# Patient Record
Sex: FEMALE | Race: WHITE | NOT HISPANIC OR LATINO | Employment: UNEMPLOYED | URBAN - METROPOLITAN AREA
[De-identification: names, ages, dates, MRNs, and addresses within clinical notes are randomized per-mention and may not be internally consistent; named-entity substitution may affect disease eponyms.]

---
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
## 2022-01-23 DIAGNOSIS — G43.009 MIGRAINE WITHOUT AURA AND WITHOUT STATUS MIGRAINOSUS, NOT INTRACTABLE: ICD-10-CM

## 2022-01-23 RX ORDER — SUMATRIPTAN 50 MG/1
50 TABLET, FILM COATED ORAL ONCE AS NEEDED
Qty: 12 TABLET | Refills: 0 | Status: SHIPPED | OUTPATIENT
Start: 2022-01-23 | End: 2022-04-27

## 2022-02-14 ENCOUNTER — TELEPHONE (OUTPATIENT)
Dept: FAMILY MEDICINE CLINIC | Facility: CLINIC | Age: 22
End: 2022-02-14

## 2022-02-14 NOTE — TELEPHONE ENCOUNTER
Cannot be seen virtually as [place] is [place]  She can see a provider locally       Meek Raphael, [facility]  2/14/2022 1:53 PM

## 2022-02-14 NOTE — TELEPHONE ENCOUNTER
Pt notified       Pt will look for a provider in [place] in case of medical emergency, pt aware of the licence rule for certain states  and said she had earlier the same issues with the therapist      Pt will schedule appt in May with Dr Sullivan upon arrival

## 2022-02-14 NOTE — TELEPHONE ENCOUNTER
T/c from pt -     Pt would like to discuss ongoing migraine issues which are getting more intense  Pt stays in [place] until May 2022 (has school until May 2022)  -  Pt aware of that that Dillan Torre does not have licence for [place] state - pt requested to speak to Dr Cristofer Leong about options / possibilities virtually  Please advise       #[phone]

## 2022-03-14 ENCOUNTER — OFFICE VISIT (OUTPATIENT)
Dept: FAMILY MEDICINE CLINIC | Facility: CLINIC | Age: 22
End: 2022-03-14
Payer: COMMERCIAL

## 2022-03-14 VITALS
SYSTOLIC BLOOD PRESSURE: 104 MMHG | WEIGHT: 131 LBS | BODY MASS INDEX: 24.11 KG/M2 | OXYGEN SATURATION: 98 % | HEART RATE: 76 BPM | DIASTOLIC BLOOD PRESSURE: 74 MMHG | HEIGHT: 62 IN | TEMPERATURE: 97.7 F

## 2022-03-14 DIAGNOSIS — G43.009 MIGRAINE WITHOUT AURA AND WITHOUT STATUS MIGRAINOSUS, NOT INTRACTABLE: Primary | ICD-10-CM

## 2022-03-14 DIAGNOSIS — F32.1 MODERATE MAJOR DEPRESSION (HCC): ICD-10-CM

## 2022-03-14 DIAGNOSIS — F41.1 GAD (GENERALIZED ANXIETY DISORDER): ICD-10-CM

## 2022-03-14 PROBLEM — Z30.41 ENCOUNTER FOR SURVEILLANCE OF CONTRACEPTIVE PILLS: Status: RESOLVED | Noted: 2018-11-20 | Resolved: 2022-03-14

## 2022-03-14 PROCEDURE — 1036F TOBACCO NON-USER: CPT | Performed by: FAMILY MEDICINE

## 2022-03-14 PROCEDURE — 99214 OFFICE O/P EST MOD 30 MIN: CPT | Performed by: FAMILY MEDICINE

## 2022-03-14 PROCEDURE — 3008F BODY MASS INDEX DOCD: CPT | Performed by: FAMILY MEDICINE

## 2022-03-14 PROCEDURE — 3725F SCREEN DEPRESSION PERFORMED: CPT | Performed by: FAMILY MEDICINE

## 2022-03-14 RX ORDER — TOPIRAMATE 25 MG/1
TABLET ORAL
Qty: 87 TABLET | Refills: 0 | Status: SHIPPED | OUTPATIENT
Start: 2022-03-14 | End: 2022-04-05

## 2022-03-14 NOTE — PATIENT INSTRUCTIONS
Please taking 25 mg of Topamax daily for 3 days, then increase to 25 mg of Topamax two times per day for 7 days  Then increase to 50 mg in the morning AND 25 mgg in the evening for 7 days  Lastly, increase to 50 mg of Topamax 2 times per day

## 2022-04-25 DIAGNOSIS — G43.009 MIGRAINE WITHOUT AURA AND WITHOUT STATUS MIGRAINOSUS, NOT INTRACTABLE: ICD-10-CM

## 2022-04-26 RX ORDER — TOPIRAMATE 25 MG/1
50 TABLET ORAL 2 TIMES DAILY
Qty: 120 TABLET | Refills: 0 | Status: SHIPPED | OUTPATIENT
Start: 2022-04-26 | End: 2022-05-16 | Stop reason: ALTCHOICE

## 2022-04-27 DIAGNOSIS — G43.009 MIGRAINE WITHOUT AURA AND WITHOUT STATUS MIGRAINOSUS, NOT INTRACTABLE: ICD-10-CM

## 2022-04-27 RX ORDER — SUMATRIPTAN 50 MG/1
TABLET, FILM COATED ORAL
Qty: 12 TABLET | Refills: 0 | Status: SHIPPED | OUTPATIENT
Start: 2022-04-27 | End: 2022-05-16 | Stop reason: SDUPTHER

## 2022-05-16 ENCOUNTER — OFFICE VISIT (OUTPATIENT)
Dept: FAMILY MEDICINE CLINIC | Facility: CLINIC | Age: 22
End: 2022-05-16
Payer: COMMERCIAL

## 2022-05-16 VITALS
SYSTOLIC BLOOD PRESSURE: 124 MMHG | WEIGHT: 137 LBS | HEIGHT: 62 IN | BODY MASS INDEX: 25.21 KG/M2 | TEMPERATURE: 98.8 F | DIASTOLIC BLOOD PRESSURE: 74 MMHG | OXYGEN SATURATION: 97 % | HEART RATE: 84 BPM

## 2022-05-16 DIAGNOSIS — G43.009 MIGRAINE WITHOUT AURA AND WITHOUT STATUS MIGRAINOSUS, NOT INTRACTABLE: ICD-10-CM

## 2022-05-16 DIAGNOSIS — Z00.00 ANNUAL PHYSICAL EXAM: Primary | ICD-10-CM

## 2022-05-16 DIAGNOSIS — F32.1 MODERATE MAJOR DEPRESSION (HCC): ICD-10-CM

## 2022-05-16 PROBLEM — F32.2 MODERATELY SEVERE MAJOR DEPRESSION (HCC): Status: ACTIVE | Noted: 2022-05-16

## 2022-05-16 PROBLEM — F32.2 MODERATELY SEVERE MAJOR DEPRESSION (HCC): Status: RESOLVED | Noted: 2022-05-16 | Resolved: 2022-05-16

## 2022-05-16 PROCEDURE — 99395 PREV VISIT EST AGE 18-39: CPT | Performed by: FAMILY MEDICINE

## 2022-05-16 PROCEDURE — 3008F BODY MASS INDEX DOCD: CPT | Performed by: FAMILY MEDICINE

## 2022-05-16 PROCEDURE — 1036F TOBACCO NON-USER: CPT | Performed by: FAMILY MEDICINE

## 2022-05-16 PROCEDURE — 3725F SCREEN DEPRESSION PERFORMED: CPT | Performed by: FAMILY MEDICINE

## 2022-05-16 RX ORDER — SUMATRIPTAN 50 MG/1
50 TABLET, FILM COATED ORAL ONCE AS NEEDED
Qty: 12 TABLET | Refills: 0 | Status: SHIPPED | OUTPATIENT
Start: 2022-05-16

## 2022-05-16 NOTE — PATIENT INSTRUCTIONS

## 2022-05-16 NOTE — PROGRESS NOTES
[address]     NAME: Paulette Ramboperla  AGE: 23 y o  SEX: female  : 2000     DATE: 2022     Assessment and Plan:     Problem List Items Addressed This Visit        Cardiovascular and Mediastinum    Migraine without aura and without status migrainosus, not intractable    Relevant Medications    SUMAtriptan (IMITREX) 50 mg tablet       Other    Moderate major depression ([address] )      Other Visit Diagnoses     Annual physical exam    -  Primary    BMI 25 0-25 9,adult            Immunizations and preventive care screenings were discussed with patient today  Appropriate education was printed on patient's after visit summary  Counseling:  Alcohol/drug use: discussed moderation in alcohol intake, the recommendations for healthy alcohol use, and avoidance of illicit drug use  Dental Health: discussed importance of regular tooth brushing, flossing, and dental visits  Sexual health: discussed sexually transmitted diseases, partner selection, use of condoms, avoidance of unintended pregnancy, and contraceptive alternatives  · Exercise: the importance of regular exercise/physical activity was discussed  Recommend exercise 3-5 times per week for at least 30 minutes  BMI Counseling: Body mass index is 25 06 kg/m²  The BMI is above normal  Nutrition recommendations include decreasing portion sizes, encouraging healthy choices of fruits and vegetables, consuming healthier snacks, limiting drinks that contain sugar, moderation in carbohydrate intake, increasing intake of lean protein and reducing intake of cholesterol  Exercise recommendations include moderate physical activity 150 minutes/week and exercising 3-5 times per week  No pharmacotherapy was ordered  Rationale for BMI follow-up plan is due to patient being overweight or obese         Return in about 1 year (around 2023) for Annual physical  Chief Complaint:     Chief Complaint   Patient presents with    Physical Exam      History of Present Illness:     Adult Annual Physical   Patient here for a comprehensive physical exam  The patient reports no problems  Notes that she stopped her Topamax as it was making her nauseous  Notes that she left her Imitrex at school and would like a refill so she can use it while at home  Diet and Physical Activity  · Diet/Nutrition: well balanced diet  · Exercise: walking, moderate cardiovascular exercise, 3-4 times a week on average and 30-60 minutes on average  Depression Screening  PHQ-2/9 Depression Screening    Little interest or pleasure in doing things: 2 - more than half the days  Feeling down, depressed, or hopeless: 1 - several days  Trouble falling or staying asleep, or sleeping too much: 3 - nearly every day  Feeling tired or having little energy: 2 - more than half the days  Poor appetite or overeatin - not at all  Feeling bad about yourself - or that you are a failure or have let yourself or your family down: 0 - not at all  Trouble concentrating on things, such as reading the newspaper or watching television: 1 - several days  Moving or speaking so slowly that other people could have noticed  Or the opposite - being so fidgety or restless that you have been moving around a lot more than usual: 1 - several days  Thoughts that you would be better off dead, or of hurting yourself in some way: 0 - not at all  PHQ-9 Score: 10   PHQ-9 Interpretation: Moderate depression      Feels that she is doing well from a mental health standpoint  Will resume therapy with her counselor when she gets back to school  General Health  · Sleep: sleeps well and gets 7-8 hours of sleep on average  · Hearing: normal - bilateral   · Vision: no vision problems  · Dental: regular dental visits, brushes teeth twice daily and does not floss       /GYN Health  · Last menstrual period: 22  · Contraceptive method: oral contraceptives  · History of STDs?: no   · Not sexually active currently  Review of Systems:     Review of Systems   Constitutional: Negative for activity change, appetite change, fatigue and fever  HENT: Negative for congestion, ear pain, rhinorrhea and sore throat  Eyes: Negative for pain  Respiratory: Negative for cough and shortness of breath  Cardiovascular: Negative for chest pain and leg swelling  Gastrointestinal: Negative for abdominal distention, abdominal pain, constipation, diarrhea, nausea and vomiting  Genitourinary: Negative for dysuria, frequency and urgency  Musculoskeletal: Negative for gait problem  Skin: Negative for rash  Neurological: Negative for dizziness, light-headedness and headaches  Past Medical History:     Past Medical History:   Diagnosis Date    Hordeolum externum     unspecified laterality Last assessed: 5/1/2014    Scoliosis     description: 6 3 degrees in 3/2013    Walking pneumonia     last assessed: 1/20/2016      Past Surgical History:     Past Surgical History:   Procedure Laterality Date    ANKLE SURGERY Left 04/2017      Social History:     Social History     Socioeconomic History    Marital status: Single     Spouse name: None    Number of children: None    Years of education: None    Highest education level: None   Occupational History    None   Tobacco Use    Smoking status: Never Smoker    Smokeless tobacco: Never Used    Tobacco comment: no smoke exposure   Vaping Use    Vaping Use: Never used   Substance and Sexual Activity    Alcohol use:  Yes     Alcohol/week: 2 0 standard drinks     Types: 2 Standard drinks or equivalent per week    Drug use: Never    Sexual activity: Never     Birth control/protection: OCP   Other Topics Concern    None   Social History Narrative    Living with parents - parents , younger brother, younger sister    Always uses seat belt    Caffeine use    Completed 12 th grade, [employer], 2020; will attend college in [place] in 2020    Feels safe at home     - denied     Has carbon monoxide detectors in home    Has smoke detectors    Pets:Cat    Student     Social Determinants of Health     Financial Resource Strain: Not on file   Food Insecurity: Not on file   Transportation Needs: Not on file   Physical Activity: Not on file   Stress: Not on file   Social Connections: Not on file   Intimate Partner Violence: Not on file   Housing Stability: Not on file      Family History:     Family History   Problem Relation Age of Onset    Migraines Mother     No Known Problems Father     Bone cancer Family     Heart disease Family     Lung cancer Maternal Grandmother     Multiple sclerosis Maternal Grandmother     No Known Problems Paternal Grandmother     Heart disease Paternal Grandfather       Current Medications:     Current Outpatient Medications   Medication Sig Dispense Refill    albuterol (PROVENTIL HFA,VENTOLIN HFA) 90 mcg/act inhaler Inhale 2 puffs every 4 (four) hours as needed for wheezing or shortness of breath (cough) 1 Inhaler 0    escitalopram (LEXAPRO) 20 mg tablet Take 1 tablet (20 mg total) by mouth daily 90 tablet 1    hydrOXYzine HCL (ATARAX) 25 mg tablet Take 1 tablet (25 mg total) by mouth every 6 (six) hours as needed for anxiety 30 tablet 0    Junel FE  1-20 MG-MCG per tablet Take 1 tablet by mouth daily 28 tablet 13    omeprazole (PriLOSEC) 20 mg delayed release capsule TAKE 1 CAPSULE BY MOUTH EVERY DAY 90 capsule 1    SUMAtriptan (IMITREX) 50 mg tablet Take 1 tablet (50 mg total) by mouth once as needed for migraine 12 tablet 0    triamcinolone (KENALOG) 0 5 % ointment Apply topically 2 (two) times a day 30 g 0     No current facility-administered medications for this visit        Allergies:     No Known Allergies      Physical Exam:     /74 (BP Location: Left arm, Patient Position: Sitting, Cuff Size: Adult) Pulse 84   Temp 98 8 °F (37 1 °C)   Ht 5' 2" (1 575 m)   Wt 62 1 kg (137 lb)   LMP 05/08/2022   SpO2 97%   BMI 25 06 kg/m²     Physical Exam  Vitals reviewed  Constitutional:       General: She is not in acute distress  Appearance: Normal appearance  HENT:      Head: Normocephalic and atraumatic  Right Ear: External ear normal       Left Ear: External ear normal       Nose: Nose normal       Mouth/Throat:      Mouth: Mucous membranes are moist    Eyes:      Extraocular Movements: Extraocular movements intact  Conjunctiva/sclera: Conjunctivae normal       Pupils: Pupils are equal, round, and reactive to light  Cardiovascular:      Rate and Rhythm: Normal rate and regular rhythm  Heart sounds: Normal heart sounds  Pulmonary:      Effort: Pulmonary effort is normal       Breath sounds: Normal breath sounds  Abdominal:      General: Bowel sounds are normal  There is no distension  Palpations: Abdomen is soft  Tenderness: There is no abdominal tenderness  Musculoskeletal:      Cervical back: Neck supple  Right lower leg: No edema  Left lower leg: No edema  Lymphadenopathy:      Cervical: No cervical adenopathy  Skin:     General: Skin is warm  Capillary Refill: Capillary refill takes less than 2 seconds  Findings: No rash  Neurological:      Mental Status: She is alert  Mental status is at baseline          Alesia Murrell DO   [address]

## 2022-06-04 DIAGNOSIS — F32.2 MODERATELY SEVERE MAJOR DEPRESSION (HCC): ICD-10-CM

## 2022-06-04 RX ORDER — ESCITALOPRAM OXALATE 20 MG/1
TABLET ORAL
Qty: 90 TABLET | Refills: 1 | Status: SHIPPED | OUTPATIENT
Start: 2022-06-04

## 2022-06-23 ENCOUNTER — OFFICE VISIT (OUTPATIENT)
Dept: OBGYN CLINIC | Facility: MEDICAL CENTER | Age: 22
End: 2022-06-23
Payer: COMMERCIAL

## 2022-06-23 VITALS
SYSTOLIC BLOOD PRESSURE: 110 MMHG | BODY MASS INDEX: 24.77 KG/M2 | DIASTOLIC BLOOD PRESSURE: 70 MMHG | HEIGHT: 62 IN | WEIGHT: 134.6 LBS

## 2022-06-23 DIAGNOSIS — R10.2 VAGINAL PAIN: Primary | ICD-10-CM

## 2022-06-23 DIAGNOSIS — N76.0 BV (BACTERIAL VAGINOSIS): ICD-10-CM

## 2022-06-23 DIAGNOSIS — Z30.41 ENCOUNTER FOR SURVEILLANCE OF CONTRACEPTIVE PILLS: ICD-10-CM

## 2022-06-23 DIAGNOSIS — B96.89 BV (BACTERIAL VAGINOSIS): ICD-10-CM

## 2022-06-23 DIAGNOSIS — B37.9 YEAST INFECTION: ICD-10-CM

## 2022-06-23 LAB
BV WHIFF TEST VAG QL: SLIGHT
CLUE CELLS SPEC QL WET PREP: ABNORMAL
PH SMN: 4 [PH]
T VAGINALIS VAG QL WET PREP: ABNORMAL
YEAST VAG QL WET PREP: ABNORMAL

## 2022-06-23 PROCEDURE — 1036F TOBACCO NON-USER: CPT | Performed by: OBSTETRICS & GYNECOLOGY

## 2022-06-23 PROCEDURE — 87210 SMEAR WET MOUNT SALINE/INK: CPT | Performed by: OBSTETRICS & GYNECOLOGY

## 2022-06-23 PROCEDURE — 99214 OFFICE O/P EST MOD 30 MIN: CPT | Performed by: OBSTETRICS & GYNECOLOGY

## 2022-06-23 PROCEDURE — 3008F BODY MASS INDEX DOCD: CPT | Performed by: OBSTETRICS & GYNECOLOGY

## 2022-06-23 RX ORDER — METRONIDAZOLE 500 MG/1
500 TABLET ORAL EVERY 12 HOURS SCHEDULED
Qty: 14 TABLET | Refills: 0 | Status: SHIPPED | OUTPATIENT
Start: 2022-06-23 | End: 2022-06-30

## 2022-06-23 RX ORDER — FLUCONAZOLE 150 MG/1
TABLET ORAL
Qty: 2 TABLET | Refills: 0 | Status: SHIPPED | OUTPATIENT
Start: 2022-06-23 | End: 2022-06-26

## 2022-06-23 RX ORDER — NORETHINDRONE ACETATE AND ETHINYL ESTRADIOL AND FERROUS FUMARATE 1MG-20(21)
1 KIT ORAL DAILY
Qty: 28 TABLET | Refills: 2 | Status: SHIPPED | OUTPATIENT
Start: 2022-06-23 | End: 2022-07-18

## 2022-06-23 NOTE — PATIENT INSTRUCTIONS
Perineal Hygiene     No soaps or feminine wash to the vulva  Use only water to cleanse, or water with Dove or [employer] if necessary  No lotion to the area  Use only coconut oil for moisture if needed   No douching     Cotton underware, loose fitting clothing  Only perfume-free, dye-free laundry detergent, use a second rinse cycle   Avoid fabric softeners/dryer sheets  Coconut oil as a lubricant (if not using condoms) or another scent-free lubricant (Astroglide, Uberlube) if needed  Partner to avoid the same products as well  Over the counter probiotic to restore vaginal abdulkadir may be helpful as well     You may also look into Boric Acid vaginal suppositories to restore vaginal PH balance for up to 2 weeks as directed on the box  You may not use these if you are pregnant     Yeast Infection   AMBULATORY CARE:   A yeast infection , or vaginal candidiasis, is a common vaginal infection  A yeast infection is caused by a fungus, or yeast-like germ  Fungi are normally found in your vagina  Too many fungi can cause an infection  Common signs and symptoms: Thick, white, cheese-like discharge from your vagina    Itching, swelling, and redness in your vagina    Pain or burning when you urinate    Pain during sexual intercourse    Call your doctor or gynecologist if:   You have a fever and chills  You develop abdominal or pelvic pain  Your discharge is bloody and it is not your monthly period  Your signs and symptoms get worse, even after treatment  You have questions or concerns about your condition or care  Treatment for a yeast infection  includes medicines to treat the fungal infection and decrease inflammation  The medicine may be a pill, cream, ointment, or vaginal tablet or suppository  Keep your vagina healthy:   Clean your genital area with mild soap and warm water each day  Do not get soap inside your vagina  Gently dry the area after washing   Do not use hot tubs  The heat and moisture from hot tubs can increase your risk for another yeast infection  Always wipe from front to back  after you use the toilet  This prevents spreading bacteria from your rectal area into your vagina  Do not wear tight-fitting clothes or undergarments  for long periods of time  Wear cotton underwear during the day  Cotton helps keep your genital area dry and does not hold in warmth or moisture  Do not wear underwear at night  Do not douche  or use feminine hygiene sprays or bubble bath  Do not use pads or tampons that are scented, or colored or perfumed toilet paper  Do not have sex until your symptoms go away  Have your partner wear a condom until you complete your course of medication  Ask your healthcare provider about birth control options if necessary  Condoms have latex and diaphragms have gel that kills sperm  Both of these may irritate your genital area  Follow up with your doctor or gynecologist as directed:  Write down your questions so you remember to ask them during your visits  © Copyright [employer] 2022 Information is for End User's use only and may not be sold, redistributed or otherwise used for commercial purposes  All illustrations and images included in CareNotes® are the copyrighted property of A D A M , Inc  or [facility][note 3] [facility]  The above information is an  only  It is not intended as medical advice for individual conditions or treatments  Talk to your doctor, nurse or pharmacist before following any medical regimen to see if it is safe and effective for you  Bacterial Vaginosis   AMBULATORY CARE:   Bacterial vaginosis  is an infection in the vagina  It may cause vaginitis (irritation and inflammation of the vagina)  The cause is not known  Bacteria normally found in the vagina are imbalanced  Your risk increases if you are sexually active, you use a douche, or you have an intrauterine device (IUD)         Common signs and symptoms of bacterial vaginosis:   White, gray, or yellow vaginal discharge    Vaginal discharge that smells like fish    Itching or burning around the outside of your vagina    Call your doctor or gynecologist if:   Your symptoms come back or do not improve with treatment  You have vaginal bleeding that is not your monthly period  You have questions or concerns about your condition or care  Antibiotics  are given to kill the bacteria  They may be given as a pill or as a cream to put in your vagina  Bacterial vaginosis and pregnancy:  If you have bacterial vaginosis during pregnancy, your baby may be born early or have a low birth weight  Your healthcare provider may recommend testing for bacterial vaginosis before or during your pregnancy  He or she will talk to you about your risk for premature delivery, and make sure you know the benefits and risks of testing  Prevent bacterial vaginosis:   Keep your vaginal area clean and dry  Wear underwear and pantyhose with a cotton crotch  Wipe from front to back after you urinate or have a bowel movement  After you bathe, rinse soap from your vaginal area to decrease your risk for irritation  Do not use products that cause irritation  Always use unscented tampons or sanitary pads  Do not use feminine sprays, powders, or scented tampons  They may cause irritation and increase your risk for vaginosis  Detergents and fabric softeners may also cause irritation  Do not use a douche  This can cause an imbalance in healthy vaginal bacteria  Use latex condoms during sex  This helps prevent another infection and keeps your partner from getting the infection  Follow up with your doctor or gynecologist as directed: Bacterial vaginosis increases the risk for several health problems, such as pelvic inflammatory disease (PID) or sexually transmitted infections  Work with your healthcare providers to schedule regular appointments to check for health problems  Write down your questions so you remember to ask them during your visits  © Copyright [employer] 2022 Information is for End User's use only and may not be sold, redistributed or otherwise used for commercial purposes  All illustrations and images included in CareNotes® are the copyrighted property of A D A M , Inc  or Whit Denson  The above information is an  only  It is not intended as medical advice for individual conditions or treatments  Talk to your doctor, nurse or pharmacist before following any medical regimen to see if it is safe and effective for you

## 2022-06-23 NOTE — PROGRESS NOTES
Pt is her for vaginal pain but started with ER visit in May R lower abdominal pain thinking it was her appendix, but just checking her vaginally hurt,even putting a tampon hurts  Pt is not sexually active

## 2022-06-23 NOTE — PROGRESS NOTES
Assessment/Plan:    No problem-specific Assessment & Plan notes found for this encounter  Diagnoses and all orders for this visit:    Vaginal pain  -     US pelvis complete w transvaginal; Future  -     POCT wet mount    Yeast infection  -     metroNIDAZOLE (FLAGYL) 500 mg tablet; Take 1 tablet (500 mg total) by mouth every 12 (twelve) hours for 7 days  -     fluconazole (DIFLUCAN) 150 mg tablet; Take one today and 1 in 3 days  -     triamcinolone (KENALOG) 0 1 % ointment; Apply topically 2 (two) times a day    BV (bacterial vaginosis)  -     metroNIDAZOLE (FLAGYL) 500 mg tablet; Take 1 tablet (500 mg total) by mouth every 12 (twelve) hours for 7 days  -     fluconazole (DIFLUCAN) 150 mg tablet; Take one today and 1 in 3 days  -     triamcinolone (KENALOG) 0 1 % ointment; Apply topically 2 (two) times a day  -     POCT wet mount    Encounter for surveillance of contraceptive pills  -     Junel FE 1/20 1-20 MG-MCG per tablet; Take 1 tablet by mouth daily          Subjective:      Patient ID: Carmelina Santiago is a 23 y o  female  28-year-old female presents for vaginal and vulvar pain  Patient reports that her vulva has been painful for a few months  Patient reports that it is painful to insert a tampon  Patient was seen in the emergency room in May for possible appendicitis, patient had a pelvic exam completed as well as a transvaginal ultrasound at that time and was in exquisite pain  Patient has never been sexually active, on OCPs for menstrual management,  does get a menstrual cycle monthly and wears pads  Rarely uses tampons  HPV vaccines are complete  Ultrasound report from [facility] inconclusive due to diet if use gas patterns  Requesting refill of birth control pills    We discussed the need for an annual exam   Given 2 packs of pills to hold her over until she can get schedule for annual exam   Follow-up for pelvic pain can be at the same time      The following portions of the patient's history were reviewed and updated as appropriate: allergies, current medications, past family history, past medical history, past social history, past surgical history and problem list     Review of Systems   Constitutional: Negative for chills, fatigue and fever  Eyes: Negative for visual disturbance  Respiratory: Negative for cough and shortness of breath  Cardiovascular: Negative for chest pain  Gastrointestinal: Negative for abdominal pain  Genitourinary: Positive for vaginal pain  Negative for vaginal bleeding and vaginal discharge  Objective:      /70 (BP Location: Left arm, Patient Position: Sitting, Cuff Size: Standard)   Ht 5' 2" (1 575 m)   Wt 61 1 kg (134 lb 9 6 oz)   LMP 06/06/2022 (Exact Date)   BMI 24 62 kg/m²          Physical Exam  Vitals and nursing note reviewed  Constitutional:       Appearance: Normal appearance  She is normal weight  HENT:      Head: Normocephalic and atraumatic  Eyes:      Conjunctiva/sclera: Conjunctivae normal    Cardiovascular:      Rate and Rhythm: Normal rate  Pulmonary:      Effort: Pulmonary effort is normal    Genitourinary:     General: Normal vulva  Exam position: Lithotomy position  Neel stage (genital): 5  Labia:         Right: Tenderness present  No rash, lesion or injury  Left: Tenderness present  No rash, lesion or injury  Vagina: Vaginal discharge present  Uterus: Normal        Adnexa: Right adnexa normal and left adnexa normal           Comments: Area of excoriation noted lower vulva near introitus  Thin white watery discharge noticed vaginal introitus  Sensitive to Q-tip touch at areas 3 6 and 9 in lower vulval  Able to pass a single Q-tip into the vagina with no pain  Able to pass pediatric speculum into the vagina with no pain  Unable to distinguish cervical os at this exam  Musculoskeletal:         General: Normal range of motion  Cervical back: Normal range of motion  Lymphadenopathy:      Lower Body: No right inguinal adenopathy  No left inguinal adenopathy  Skin:     General: Skin is warm and dry  Neurological:      Mental Status: She is alert  Psychiatric:         Mood and Affect: Mood normal          Behavior: Behavior normal          Thought Content: Thought content normal          Judgment: Judgment normal        we reviewed perineal hygiene at length patient does use body wash advised to discontinue body wash and used nothing but warm water and Dove sensitive bar soap to perineal area wet mount was positive for yeast and BV, medications reviewed and instructions for use given to patient  Transvaginal ultrasound ordered after the completion have her treatment for for you there evaluation    We will follow-up in 4-6 weeks for further evaluation of pelvic pain    See after visit summary for further instructions and information

## 2022-07-16 DIAGNOSIS — Z30.41 ENCOUNTER FOR SURVEILLANCE OF CONTRACEPTIVE PILLS: ICD-10-CM

## 2022-07-18 RX ORDER — NORETHINDRONE ACETATE AND ETHINYL ESTRADIOL AND FERROUS FUMARATE 1MG-20(21)
KIT ORAL
Qty: 84 TABLET | Refills: 1 | Status: SHIPPED | OUTPATIENT
Start: 2022-07-18 | End: 2022-07-22 | Stop reason: SDUPTHER

## 2022-07-20 ENCOUNTER — OFFICE VISIT (OUTPATIENT)
Dept: FAMILY MEDICINE CLINIC | Facility: CLINIC | Age: 22
End: 2022-07-20
Payer: COMMERCIAL

## 2022-07-20 VITALS
HEIGHT: 62 IN | DIASTOLIC BLOOD PRESSURE: 68 MMHG | SYSTOLIC BLOOD PRESSURE: 124 MMHG | WEIGHT: 133 LBS | BODY MASS INDEX: 24.48 KG/M2 | OXYGEN SATURATION: 99 % | TEMPERATURE: 96.4 F | HEART RATE: 77 BPM

## 2022-07-20 DIAGNOSIS — K21.9 GASTROESOPHAGEAL REFLUX DISEASE, UNSPECIFIED WHETHER ESOPHAGITIS PRESENT: ICD-10-CM

## 2022-07-20 DIAGNOSIS — F32.A MILD DEPRESSION: ICD-10-CM

## 2022-07-20 DIAGNOSIS — G43.009 MIGRAINE WITHOUT AURA AND WITHOUT STATUS MIGRAINOSUS, NOT INTRACTABLE: Primary | ICD-10-CM

## 2022-07-20 DIAGNOSIS — F41.1 GAD (GENERALIZED ANXIETY DISORDER): ICD-10-CM

## 2022-07-20 PROCEDURE — 3725F SCREEN DEPRESSION PERFORMED: CPT | Performed by: FAMILY MEDICINE

## 2022-07-20 PROCEDURE — 99214 OFFICE O/P EST MOD 30 MIN: CPT | Performed by: FAMILY MEDICINE

## 2022-07-20 NOTE — PROGRESS NOTES
Assessment/Plan:    No problem-specific Assessment & Plan notes found for this encounter  Diagnoses and all orders for this visit:    Migraine without aura and without status migrainosus, not intractable  Stable  Continue current management  JACOB (generalized anxiety disorder)  Mild depression  Stable, continue Lexapro, follow up with therapist at school in the   Gastroesophageal reflux disease, unspecified whether esophagitis present  Stable  Well controlled with diet, omeprazole PRN  F/U 5-6 months (on her winter break from college)    Subjective:      Patient ID: Emerita Saxena is a 23 y o  female  HPI     Patient presents to the office for follow up  Notes that she leaves for college on   She will be seeing her therapist at school when she returns  States that she is doing well without concerns  Does not need refills on any of her medications at this time  GERD is well controlled  2 migraines all summer  PHQ-2/9 Depression Screening    Little interest or pleasure in doing things: 1 - several days  Feeling down, depressed, or hopeless: 1 - several days  Trouble falling or staying asleep, or sleeping too much: 2 - more than half the days  Feeling tired or having little energy: 1 - several days  Poor appetite or overeatin - not at all  Feeling bad about yourself - or that you are a failure or have let yourself or your family down: 1 - several days  Trouble concentrating on things, such as reading the newspaper or watching television: 0 - not at all  Moving or speaking so slowly that other people could have noticed   Or the opposite - being so fidgety or restless that you have been moving around a lot more than usual: 1 - several days  Thoughts that you would be better off dead, or of hurting yourself in some way: 0 - not at all  PHQ-9 Score: 7   PHQ-9 Interpretation: Mild depression        The following portions of the patient's history were reviewed and updated as appropriate: allergies, current medications, past family history, past medical history, past social history, past surgical history and problem list     Review of Systems   Constitutional: Negative for activity change, appetite change, fatigue and fever  HENT: Negative for congestion, ear pain, rhinorrhea and sore throat  Eyes: Negative for pain  Respiratory: Negative for cough and shortness of breath  Cardiovascular: Negative for chest pain and leg swelling  Gastrointestinal: Negative for abdominal distention, abdominal pain, constipation, diarrhea, nausea and vomiting  Genitourinary: Negative for dysuria, frequency and urgency  Musculoskeletal: Negative for gait problem  Skin: Negative for rash  Neurological: Negative for dizziness, light-headedness and headaches  Objective:  /68 (BP Location: Left arm, Patient Position: Sitting, Cuff Size: Adult)   Pulse 77   Temp (!) 96 4 °F (35 8 °C)   Ht 5' 2" (1 575 m)   Wt 60 3 kg (133 lb)   SpO2 99%   BMI 24 33 kg/m²      Physical Exam  Vitals reviewed  Constitutional:       General: She is not in acute distress  Appearance: Normal appearance  HENT:      Head: Normocephalic and atraumatic  Right Ear: External ear normal       Left Ear: External ear normal       Nose: Nose normal       Mouth/Throat:      Mouth: Mucous membranes are moist    Eyes:      Extraocular Movements: Extraocular movements intact  Conjunctiva/sclera: Conjunctivae normal       Pupils: Pupils are equal, round, and reactive to light  Cardiovascular:      Rate and Rhythm: Normal rate and regular rhythm  Heart sounds: Normal heart sounds  Pulmonary:      Effort: Pulmonary effort is normal       Breath sounds: Normal breath sounds  No wheezing, rhonchi or rales  Abdominal:      General: Abdomen is flat  Bowel sounds are normal  There is no distension  Palpations: Abdomen is soft  Tenderness: There is no abdominal tenderness  Musculoskeletal:      Right lower leg: No edema  Left lower leg: No edema  Skin:     General: Skin is warm  Capillary Refill: Capillary refill takes less than 2 seconds  Findings: No rash  Neurological:      Mental Status: She is alert  Mental status is at baseline           DO Helio [facility]  7/20/2022 11:26 AM

## 2022-07-22 ENCOUNTER — ANNUAL EXAM (OUTPATIENT)
Dept: OBGYN CLINIC | Facility: CLINIC | Age: 22
End: 2022-07-22
Payer: COMMERCIAL

## 2022-07-22 VITALS
BODY MASS INDEX: 24.88 KG/M2 | WEIGHT: 135.2 LBS | HEIGHT: 62 IN | DIASTOLIC BLOOD PRESSURE: 72 MMHG | SYSTOLIC BLOOD PRESSURE: 110 MMHG

## 2022-07-22 DIAGNOSIS — Z30.41 ENCOUNTER FOR SURVEILLANCE OF CONTRACEPTIVE PILLS: ICD-10-CM

## 2022-07-22 DIAGNOSIS — Z01.419 ENCOUNTER FOR GYNECOLOGICAL EXAMINATION WITHOUT ABNORMAL FINDING: Primary | ICD-10-CM

## 2022-07-22 PROCEDURE — 99395 PREV VISIT EST AGE 18-39: CPT | Performed by: OBSTETRICS & GYNECOLOGY

## 2022-07-22 PROCEDURE — 0503F POSTPARTUM CARE VISIT: CPT | Performed by: OBSTETRICS & GYNECOLOGY

## 2022-07-22 RX ORDER — NORETHINDRONE ACETATE AND ETHINYL ESTRADIOL AND FERROUS FUMARATE 1MG-20(21)
1 KIT ORAL DAILY
Qty: 84 TABLET | Refills: 3 | Status: SHIPPED | OUTPATIENT
Start: 2022-07-22

## 2022-07-22 NOTE — PROGRESS NOTES
LMP: 2022  PMB:  SA: Never  HPV: YES all 3 doses   Birth control: Pills ( Junel FE )  Last pap: Not on file  Last mammo: Not on file:  Last colonoscopy: Not on file  Last Dexa: Not on file  Family History: Of Bone cancer   MGM- Lung cancer

## 2022-07-22 NOTE — PATIENT INSTRUCTIONS
Breast Self Exam for Women   AMBULATORY CARE:   A breast self-exam (BSE)  is a way to check your breasts for lumps and other changes  Regular BSEs can help you know how your breasts normally look and feel  Most breast lumps or changes are not cancer, but you should always have them checked by a healthcare provider  Why you should do a BSE:  Breast cancer is the most common type of cancer in women  Even if you have mammograms, you may still want to do a BSE regularly  If you know how your breasts normally feel and look, it may help you know when to contact your healthcare provider  Mammograms can miss some cancers  You may find a lump during a BSE that did not show up on a mammogram   When you should do a BSE:  If you have periods, you may want to do your BSE 1 week after your period ends  This is the time when your breasts may be the least swollen, lumpy, or tender  You can do regular BSEs even if you are breastfeeding or have breast implants  Call your doctor if:   You find any lumps or changes in your breasts  You have breast pain or fluid coming from your nipples  You have questions or concerns about your condition or care  How to do a BSE:       Look at your breasts in a mirror  Look at the size and shape of each breast and nipple  Check for swelling, lumps, dimpling, scaly skin, or other skin changes  Look for nipple changes, such as a nipple that is painful or beginning to pull inward  Gently squeeze both nipples and check to see if fluid (that is not breast milk) comes out of them  If you find any of these or other breast changes, contact your healthcare provider  Check your breasts while you sit or  the following 3 positions:    [place] your arms down at your sides  Raise your hands and join them behind your head  Put firm pressure with your hands on your hips  Bend slightly forward while you look at your breasts in the mirror  Lie down and feel your breasts    When you lie down, your breast tissue spreads out evenly over your chest  This makes it easier for you to feel for lumps and anything that may not be normal for your breasts  Do a BSE on one breast at a time  Place a small pillow or towel under your left shoulder  Put your left arm behind your head  Use the 3 middle fingers of your right hand  Use your fingertip pads, on the top of your fingers  Your fingertip pad is the most sensitive part of your finger  Use small circles to feel your breast tissue  Use your fingertip pads to make dime-sized, overlapping circles on your breast and armpits  Use light, medium, and firm pressure  First, press lightly  Second, press with medium pressure to feel a little deeper into the breast  Last, use firm pressure to feel deep within your breast     Examine your entire breast area  Examine the breast area from above the breast to below the breast where you feel only ribs  Make small circles with your fingertips, starting in the middle of your armpit  Make circles going up and down the breast area  Continue toward your breast and all the way across it  Examine the area from your armpit all the way over to the middle of your chest (breastbone)  Stop at the middle of your chest     Move the pillow or towel to your right shoulder, and put your right arm behind your head  Use the 3 fingertip pads of your left hand, and repeat the above steps to do a BSE on your right breast     What else you can do to check for breast problems or cancer:  Talk to your healthcare provider about mammograms  A mammogram is an x-ray of your breasts to screen for breast cancer or other problems  Your provider can tell you the benefits and risks of mammograms  The first mammogram is usually at age 39 or 48  Your provider may recommend you start at 36 or younger if your risk for breast cancer is high  Mammograms usually continue every 1 to 2 years until age 76         Follow up with your doctor as directed:  Write down your questions so you remember to ask them during your visits  © Copyright [employer] 2022 Information is for End User's use only and may not be sold, redistributed or otherwise used for commercial purposes  All illustrations and images included in CareNotes® are the copyrighted property of A D [employer] M [employer] Inc  or Whit Denson  The above information is an  only  It is not intended as medical advice for individual conditions or treatments  Talk to your doctor, nurse or pharmacist before following any medical regimen to see if it is safe and effective for you  Wellness Visit for Adults   AMBULATORY CARE:   A wellness visit  is when you see your healthcare provider to get screened for health problems  Your healthcare provider will also give you advice on how to stay healthy  Write down your questions so you remember to ask them  Ask your healthcare provider how often you should have a wellness visit  What happens at a wellness visit:  Your healthcare provider will ask about your health, and your family history of health problems  This includes high blood pressure, heart disease, and cancer  He or she will ask if you have symptoms that concern you, if you smoke, and about your mood  You may also be asked about your intake of medicines, supplements, food, and alcohol  Any of the following may be done: Your weight  will be checked  Your height may also be checked so your body mass index (BMI) can be calculated  Your BMI shows if you are at a healthy weight  Your blood pressure  and heart rate will be checked  Your temperature may also be checked  Blood and urine tests  may be done  Blood tests may be done to check your cholesterol levels  Abnormal cholesterol levels increase your risk for heart disease and stroke  You may also need a blood or urine test to check for diabetes if you are at increased risk  Urine tests may be done to look for signs of an infection or kidney disease      A physical exam  includes checking your heartbeat and lungs with a stethoscope  Your healthcare provider may also check your skin to look for sun damage  Screening tests  may be recommended  A screening test is done to check for diseases that may not cause symptoms  The screening tests you may need depend on your age, gender, family history, and lifestyle habits  For example, colorectal screening may be recommended if you are 48years old or older  Screening tests you need if you are a woman:   A Pap smear  is used to screen for cervical cancer  Pap smears are usually done every 3 to 5 years depending on your age  You may need them more often if you have had abnormal Pap smear test results in the past  Ask your healthcare provider how often you should have a Pap smear  A mammogram  is an x-ray of your breasts to screen for breast cancer  Experts recommend mammograms every 2 years starting at age 48 years  You may need a mammogram at age 52 years or younger if you have an increased risk for breast cancer  Talk to your healthcare provider about when you should start having mammograms and how often you need them  Vaccines you may need:   Get an influenza vaccine  every year  The influenza vaccine protects you from the flu  Several types of viruses cause the flu  The viruses change over time, so new vaccines are made each year  Get a tetanus-diphtheria (Td) booster vaccine  every 10 years  This vaccine protects you against tetanus and diphtheria  Tetanus is a severe infection that may cause painful muscle spasms and lockjaw  Diphtheria is a severe bacterial infection that causes a thick covering in the back of your mouth and throat  Get a human papillomavirus (HPV) vaccine  if you are female and aged 23 to 32 or male 23 to 24 and never received it  This vaccine protects you from HPV infection  HPV is the most common infection spread by sexual contact   HPV may also cause vaginal, penile, and anal cancers  Get a pneumococcal vaccine  if you are aged 72 years or older  The pneumococcal vaccine is an injection given to protect you from pneumococcal disease  Pneumococcal disease is an infection caused by pneumococcal bacteria  The infection may cause pneumonia, meningitis, or an ear infection  Get a shingles vaccine  if you are 60 or older, even if you have had shingles before  The shingles vaccine is an injection to protect you from the varicella-zoster virus  This is the same virus that causes chickenpox  Shingles is a painful rash that develops in people who had chickenpox or have been exposed to the virus  How to eat healthy:  My Plate is a model for planning healthy meals  It shows the types and amounts of foods that should go on your plate  Fruits and vegetables make up about half of your plate, and grains and protein make up the other half  A serving of dairy is included on the side of your plate  The amount of calories and serving sizes you need depends on your age, gender, weight, and height  Examples of healthy foods are listed below:  Eat a variety of vegetables  such as dark green, red, and orange vegetables  You can also include canned vegetables low in sodium (salt) and frozen vegetables without added butter or sauces  Eat a variety of fresh fruits , canned fruit in 100% juice, frozen fruit, and dried fruit  Include whole grains  At least half of the grains you eat should be whole grains  Examples include whole-wheat bread, wheat pasta, brown rice, and whole-grain cereals such as oatmeal     Eat a variety of protein foods such as seafood (fish and shellfish), lean meat, and poultry without skin (turkey and chicken)  Examples of lean meats include pork leg, shoulder, or tenderloin, and beef round, sirloin, tenderloin, and extra lean ground beef  Other protein foods include eggs and egg substitutes, beans, peas, soy products, nuts, and seeds      Choose low-fat dairy products such as skim or 1% milk or low-fat yogurt, cheese, and cottage cheese  Limit unhealthy fats  such as butter, hard margarine, and shortening  Exercise:  Exercise at least 30 minutes per day on most days of the week  Some examples of exercise include walking, biking, dancing, and swimming  You can also fit in more physical activity by taking the stairs instead of the elevator or parking farther away from stores  Include muscle strengthening activities 2 days each week  Regular exercise provides many health benefits  It helps you manage your weight, and decreases your risk for type 2 diabetes, heart disease, stroke, and high blood pressure  Exercise can also help improve your mood  Ask your healthcare provider about the best exercise plan for you  General health and safety guidelines:   Do not smoke  Nicotine and other chemicals in cigarettes and cigars can cause lung damage  Ask your healthcare provider for information if you currently smoke and need help to quit  E-cigarettes or smokeless tobacco still contain nicotine  Talk to your healthcare provider before you use these products  Limit alcohol  A drink of alcohol is 12 ounces of beer, 5 ounces of wine, or 1½ ounces of liquor  Lose weight, if needed  Being overweight increases your risk of certain health conditions  These include heart disease, high blood pressure, type 2 diabetes, and certain types of cancer  Protect your skin  Do not sunbathe or use tanning beds  Use sunscreen with a SPF 15 or higher  Apply sunscreen at least 15 minutes before you go outside  Reapply sunscreen every 2 hours  Wear protective clothing, hats, and sunglasses when you are outside  Drive safely  Always wear your seatbelt  Make sure everyone in your car wears a seatbelt  A seatbelt can save your life if you are in an accident  Do not use your cell phone when you are driving  This could distract you and cause an accident   Pull over if you need to make a call or send a text message  Practice safe sex  Use latex condoms if are sexually active and have more than one partner  Your healthcare provider may recommend screening tests for sexually transmitted infections (STIs)  Wear helmets, lifejackets, and protective gear  Always wear a helmet when you ride a bike or motorcycle, go skiing, or play sports that could cause a head injury  Wear protective equipment when you play sports  Wear a lifejacket when you are on a boat or doing water sports  © Copyright [employer] 2022 Information is for End User's use only and may not be sold, redistributed or otherwise used for commercial purposes  All illustrations and images included in CareNotes® are the copyrighted property of A D A M , Inc  or Whit Albrecht   The above information is an  only  It is not intended as medical advice for individual conditions or treatments  Talk to your doctor, nurse or pharmacist before following any medical regimen to see if it is safe and effective for you  HPV (Human Papillomavirus) Vaccine for Adults   AMBULATORY CARE:   The human papillomavirus (HPV) vaccine  is an injection given to females and males to protect against human papillomavirus infection  HPV is most commonly spread through sexual activity  It can also be spread from a mother to her baby during delivery  The HPV vaccine is most effective if given before sexual activity begins  This allows your body to build almost complete protection against HPV before you have contact with the virus  The HPV vaccine is still effective after sexual activity has begun  How the vaccine is given:  The HPV vaccine can be given with other vaccines  The vaccine is given in 2 or 3 doses through age 32: The first dose  is given at any time  The second dose  is given 1 to 2 months after the first dose  The third dose, if needed,  is given 6 months after the first dose      Reasons you should not get the HPV vaccine, or should wait to get it: You had a severe allergic reaction to a dose of the vaccine  You are pregnant  Your healthcare provider will tell you when you can get the vaccine  You are sick or have a fever  You may need to wait to get the vaccine until symptoms go away  Risks of the HPV vaccine: You may have pain, redness, or swelling where the shot was given  You may have a fever or headache  You may have an allergic reaction to the vaccine  This can be life-threatening  Call your local emergency number (911 in the [address] [facility]) if:   You have signs of a severe allergic reaction, such as trouble breathing, hives, or wheezing  Seek care immediately if:   You have a high fever or behavior changes that concern you  Call your doctor if:   You have questions or concerns about the HPV vaccine  Apply a warm compress  to the area to relieve swelling and pain  Follow up with your doctor as directed:  Write down your questions so you remember to ask them during your visits  © Copyright [employer] 2022 Information is for End User's use only and may not be sold, redistributed or otherwise used for commercial purposes  All illustrations and images included in CareNotes® are the copyrighted property of A D A M , Inc  or [facility]   The above information is an  only  It is not intended as medical advice for individual conditions or treatments  Talk to your doctor, nurse or pharmacist before following any medical regimen to see if it is safe and effective for you  Safe Sex Practices   AMBULATORY CARE:   Safe sex practices  are ways to prevent pregnancy and the spread of sexually transmitted infections (STIs)  An STI happens when a virus or bacteria are spread through sexual activity  Safe sex practices help decrease or prevent body fluid exchange during sex  Body fluids include saliva, urine, blood, vaginal fluids, and semen  Oral, vaginal, and anal sex can all spread STIs    Seek care immediately if:   A condom breaks, leaks, or slips off while you are having sex  You notice sores on your penis, vagina, anal area, or skin around them  You have had unsafe sex and want to discuss emergency contraception or treatment for STI exposure  Call your doctor if:   You or your female sex partner might be pregnant  You have questions or concerns about your condition or care  Safe sex practices to follow before you have sex:   Talk to a new partner before you have sex  Tell your partner if you have an STI  Ask about his or her sex history and if he or she has a current or past STI  Your partner may need to be tested and treated  Do not have sex while you are being treated for an STI, or with a partner who is being treated  Limit your number of sex partners  More than one sex partner can increase your risk for an STI  Do not have sex with anyone whose sex history you do not know  Get tested for STIs if needed  Get tested if you had sex with someone who has an STI  Get tested if you have unprotected sex with any new partner  Talk to your healthcare provider about birth control  Birth control can help prevent an unwanted pregnancy  There are many different types of birth control  Talk to your healthcare provider about which birth control method is right for you  Ask about medicines to lower your risk for some STIs:      Vaccines  can help protect you from hepatitis A, hepatitis B, and the human papillomavirus (HPV)  The HPV vaccine is usually given at 11 years, but it may be given through 26 years to both females and males  Your provider can give you more information on vaccines to prevent STIs  Pre-exposure prophylaxis (PrEP)  may be given if you are at high risk for HIV  PrEP is taken every day to prevent the virus from fully infecting the body  Do not use alcohol or drugs before sex    These can prevent you from thinking clearly and increase your risk for unsafe sex     Safe sex practices to follow while you are having sex:   Use condoms and barrier methods for all types of sexual contact  This includes oral, vaginal, and anal sex  Male and female condoms are available  Make sure that the condom fits and is put on correctly  Rubber latex sheets or dental dams can be used for oral sex  Use a new condom or latex barrier each time you have sex  Use latex condoms, if possible  Lambskin or natural condoms do not prevent STIs  If you or your partner is allergic to latex, use a nonlatex product, such as polyurethane  Use a second form of birth control with the condom to prevent pregnancy and STIs  Do not use male and female condoms together  Only use water-based lubricants during sex  Water-based lubricants help prevent sores or cuts in the vagina or on the penis  Prevent sores or cuts to decrease your risk for an STI  Do not use oil-based lubricants, such as baby oil or hand lotion, with latex condoms or barriers  These will weaken the latex and may cause the condom to break  Do not use chemicals that irritate your skin  Products that contain chemical irritants, such as spermicides, can irritate the lining of your vagina or rectum  Irritation may cause sores that can increase your risk for an STI  Be careful when you have sex if you have open sores or cuts  Open sores or cuts may increase your risk for an STI  Keep all open sores or cuts covered during sex  Do not have oral sex if you have cuts or sores in your mouth  Do not do activities that can pass germs  Do not use saliva as a lubricant or share sex toys  Follow up with your doctor as directed:  Write down your questions so you remember to ask them during your visits  © Copyright [employer] 2022 Information is for End User's use only and may not be sold, redistributed or otherwise used for commercial purposes   All illustrations and images included in CareNotes® are the copyrighted property of A  [employer] A M , Inc  or [facility]  The above information is an  only  It is not intended as medical advice for individual conditions or treatments  Talk to your doctor, nurse or pharmacist before following any medical regimen to see if it is safe and effective for you

## 2022-07-22 NOTE — PROGRESS NOTES
Diagnoses and all orders for this visit:    Encounter for gynecological examination without abnormal finding    Encounter for surveillance of contraceptive pills  -     Junel FE 1/20 1-20 MG-MCG per tablet; Take 1 tablet by mouth daily      Pleasant 23 y o  here for Annual wellness exam    Patient's last menstrual period was 07/20/2022 (exact date)  Reports menstrual cycles to be regular on OCP  Is not sexually active   Reports no changes in her health, medically stable, seen by me on 6/23 for vaginal pain, diag with BV & yeast, US was ordered and not completed  Pt reports she does not think she is having any further problems   Family Hx: No GYN cancers     Gardisil: Completed     [employer] [facility] in [place]     Past Medical History:   Diagnosis Date    Hordeolum externum     unspecified laterality Last assessed: 5/1/2014    Scoliosis     description: 6 3 degrees in 3/2013    Walking pneumonia     last assessed: 1/20/2016     Past Surgical History:   Procedure Laterality Date    ANKLE SURGERY Left 04/2017     Social History     Tobacco Use    Smoking status: Never Smoker    Smokeless tobacco: Never Used    Tobacco comment: no smoke exposure   Vaping Use    Vaping Use: Never used   Substance Use Topics    Alcohol use:  Yes     Alcohol/week: 2 0 standard drinks     Types: 2 Standard drinks or equivalent per week    Drug use: Never     Family History   Problem Relation Age of Onset   [employer] Migraines Mother     No Known Problems Father     Bone cancer Family     Heart disease Family     Lung cancer Maternal Grandmother     Multiple sclerosis Maternal Grandmother     No Known Problems Paternal Grandmother     Heart disease Paternal Grandfather        Current Outpatient Medications:     Junel FE 1/20 1-20 MG-MCG per tablet, Take 1 tablet by mouth daily, Disp: 84 tablet, Rfl: 3    albuterol (PROVENTIL HFA,VENTOLIN HFA) 90 mcg/act inhaler, Inhale 2 puffs every 4 (four) hours as needed for wheezing or shortness of breath (cough), Disp: 1 Inhaler, Rfl: 0    escitalopram (LEXAPRO) 20 mg tablet, TAKE 1 TABLET BY MOUTH EVERY DAY, Disp: 90 tablet, Rfl: 1    hydrOXYzine HCL (ATARAX) 25 mg tablet, Take 1 tablet (25 mg total) by mouth every 6 (six) hours as needed for anxiety, Disp: 30 tablet, Rfl: 0    omeprazole (PriLOSEC) 20 mg delayed release capsule, TAKE 1 CAPSULE BY MOUTH EVERY DAY, Disp: 90 capsule, Rfl: 1    SUMAtriptan (IMITREX) 50 mg tablet, Take 1 tablet (50 mg total) by mouth once as needed for migraine, Disp: 12 tablet, Rfl: 0    triamcinolone (KENALOG) 0 1 % ointment, Apply topically 2 (two) times a day, Disp: 30 g, Rfl: 0    No Known Allergies  OB History    Para Term  AB Living   0 0 0 0 0 0   SAB IAB Ectopic Multiple Live Births   0 0 0 0 0       Vitals:    22 0830   BP: 110/72   BP Location: Left arm   Patient Position: Sitting   Cuff Size: Standard   Weight: 61 3 kg (135 lb 3 2 oz)   Height: 5' 2" (1 575 m)     Body mass index is 24 73 kg/m²  Review of Systems   Constitutional: Negative for chills, fatigue, fever and unexpected weight change  Respiratory: Negative for shortness of breath  Gastrointestinal: Negative for anal bleeding, blood in stool, constipation and diarrhea  Genitourinary: Negative for difficulty urinating, dysuria and hematuria  Physical Exam   Constitutional: She appears well-developed and well-nourished  No distress  HENT: atraumatic,   Head: Normocephalic  Neck: Normal range of motion  Neck supple  Pulmonary: Effort normal  Clear to auscultation bilaterally,  Cardiovascular: Normal  RRR, no murmur auscultated  Abdominal: Soft  Nontender  Extremities: moves all extremities well, no edema noted upper or lower  Skin: clean, dry and intact, warm to touch    reasts:   Right: tissue soft without masses, tenderness, skin changes or nipple discharge  No areas of erythema or pain   No subclavicular, axillary, pectoral adenopathy  Left:  tissue soft without masses, tenderness, skin changes or nipple discharge  No areas of erythema or pain  No subclavicular, axillary, pectoral adenopathy    Pelvic exam was performed with patient supine, lithotomy position  Labia: Negative rash, tenderness, lesion or injury on the right labia  Negative rash, tenderness, lesion or injury on the left labia  Urethral meatus:  Negative for  tenderness, inflammation or discharge  Uterus: not deviated, enlarged, fixed or tender  Cervix: No CMT, no discharge or friability  Right adnexa: no mass, no tenderness and no fullness  Left adnexa: no mass, no tenderness and no fullness  Vagina: No erythema, tenderness, masses, or foreign body in the vagina  No signs of injury around the vagina  No unusual vaginal discharge   Perineum without lesions, signs of injury, erythema or swelling  Inguinal Canal:        Right: No inguinal adenopathy or hernia present  Left: No inguinal adenopathy or hernia present  Reviewed ACHES  SBE encouraged, ASCCP guidelines reviewed  Perineal hygiene reviewed   Safe sex practice reviewed, Condoms encouraged with all sexual activity to prevent STI's  Gardisil vaccines recommended up to age 39  Calcium/ Vit D dietary requirements discussed,   Weight bearing exercises minium of 150 mins/weekly advised  Advised to call with any issues,  all concerns & questions addressed     See After visit summary for more information       Follow up for Annual exams or PRN

## 2022-09-14 ENCOUNTER — TELEPHONE (OUTPATIENT)
Dept: FAMILY MEDICINE CLINIC | Facility: CLINIC | Age: 22
End: 2022-09-14

## 2022-09-14 NOTE — TELEPHONE ENCOUNTER
T/c from pt - Pt states right breast is leaking a milky substance since this am   States there is no chance she is Pregnant  She looked it up and the Internet said it could be a possible tumor  Pt is concerned  Is wondering what she should do       Please advise

## 2022-09-15 NOTE — TELEPHONE ENCOUNTER
If there is no pain, redness, fever or bloody discharge, that is reassuring  This could be related to nipple stimulation or hormonal changes  If this persists for more than 2-3 days, she should ne evaluated by a doctor       Elsie Roldan [facility]  9/15/2022 4:35 PM

## 2022-09-20 DIAGNOSIS — F41.0 PANIC DISORDER: ICD-10-CM

## 2022-09-20 RX ORDER — HYDROXYZINE HYDROCHLORIDE 25 MG/1
25 TABLET, FILM COATED ORAL EVERY 6 HOURS PRN
Qty: 30 TABLET | Refills: 0 | Status: SHIPPED | OUTPATIENT
Start: 2022-09-20

## 2022-09-30 ENCOUNTER — PATIENT MESSAGE (OUTPATIENT)
Dept: FAMILY MEDICINE CLINIC | Facility: CLINIC | Age: 22
End: 2022-09-30

## 2022-10-18 DIAGNOSIS — G43.009 MIGRAINE WITHOUT AURA AND WITHOUT STATUS MIGRAINOSUS, NOT INTRACTABLE: ICD-10-CM

## 2022-10-18 RX ORDER — SUMATRIPTAN 50 MG/1
TABLET, FILM COATED ORAL
Qty: 12 TABLET | Refills: 0 | Status: SHIPPED | OUTPATIENT
Start: 2022-10-18

## 2022-11-13 DIAGNOSIS — F32.2 MODERATELY SEVERE MAJOR DEPRESSION (HCC): ICD-10-CM

## 2022-11-13 DIAGNOSIS — G43.009 MIGRAINE WITHOUT AURA AND WITHOUT STATUS MIGRAINOSUS, NOT INTRACTABLE: ICD-10-CM

## 2022-11-13 RX ORDER — SUMATRIPTAN 50 MG/1
TABLET, FILM COATED ORAL
Qty: 12 TABLET | Refills: 0 | Status: SHIPPED | OUTPATIENT
Start: 2022-11-13

## 2022-11-13 RX ORDER — ESCITALOPRAM OXALATE 20 MG/1
TABLET ORAL
Qty: 90 TABLET | Refills: 1 | Status: SHIPPED | OUTPATIENT
Start: 2022-11-13

## 2022-12-07 ENCOUNTER — TELEPHONE (OUTPATIENT)
Dept: NEUROLOGY | Facility: CLINIC | Age: 22
End: 2022-12-07

## 2022-12-07 NOTE — TELEPHONE ENCOUNTER
12/7/22 LM ?? INSUR - REQUESTED CALL BACK  - INDEP BC IS INACTIVE    12/16 LM ??  INSUR - REQUESTED CALL BACK  - INDEP BC IS INACTIVE

## 2022-12-13 ENCOUNTER — TELEPHONE (OUTPATIENT)
Dept: NEUROLOGY | Facility: CLINIC | Age: 22
End: 2022-12-13

## 2022-12-13 ENCOUNTER — TELEPHONE (OUTPATIENT)
Dept: OBGYN CLINIC | Facility: CLINIC | Age: 22
End: 2022-12-13

## 2022-12-13 DIAGNOSIS — R10.2 PELVIC PAIN: Primary | ICD-10-CM

## 2022-12-13 NOTE — TELEPHONE ENCOUNTER
Pt last seen 7/2022 with Bert Rivera & would like a call regarding Pelvic Floor Disfunction,    Thanks

## 2022-12-13 NOTE — TELEPHONE ENCOUNTER
Called and spoke to patient to confirm their upcoming appointment with Dr Leonor Barreto  Informed patient about arriving in the [place] location 15 minutes prior to their appointment to get checked in and going over chart

## 2022-12-13 NOTE — TELEPHONE ENCOUNTER
Spoke to pt and she is having frequent UTI sx (pain in general area of urethra) but dont last, leakage issues,   Never seen a uroogist   The incontinence is not stress and cannot feel it come out  PT referral requested  Close to [facility] area

## 2022-12-19 ENCOUNTER — OFFICE VISIT (OUTPATIENT)
Dept: NEUROLOGY | Facility: CLINIC | Age: 22
End: 2022-12-19

## 2022-12-19 VITALS
WEIGHT: 135 LBS | HEART RATE: 86 BPM | HEIGHT: 62 IN | SYSTOLIC BLOOD PRESSURE: 127 MMHG | DIASTOLIC BLOOD PRESSURE: 83 MMHG | BODY MASS INDEX: 24.84 KG/M2 | TEMPERATURE: 98.4 F

## 2022-12-19 DIAGNOSIS — G43.009 MIGRAINE WITHOUT AURA AND WITHOUT STATUS MIGRAINOSUS, NOT INTRACTABLE: Primary | ICD-10-CM

## 2022-12-19 RX ORDER — ONDANSETRON 4 MG/1
1-2 TABLET, FILM COATED ORAL AS NEEDED
COMMUNITY
Start: 2022-11-11

## 2022-12-19 RX ORDER — SUMATRIPTAN 100 MG/1
100 TABLET, FILM COATED ORAL ONCE AS NEEDED
Qty: 9 TABLET | Refills: 12 | Status: SHIPPED | OUTPATIENT
Start: 2022-12-19

## 2022-12-19 NOTE — PROGRESS NOTES
[facility] Consult  PATIENT:  Adam Rosenthal  MRN:  [id number]  :  2000  DATE OF SERVICE:  2022  REFERRED BY: Self, Referral  PMD: Luciano Nelson DO    Assessment/Plan:     Adam Rosenthal is a delightful  23 y o  female with a past medical history that includes migraine, anxiety, panic disorder, moderately severe major depression, Normocytic anemia, thyroid nodules, GERD, scoliosis, costochondritis (7-8 months post viral illness early ), sinus Tarsi syndrome of left ankle, vertigo, sleep onset insomnia referred here for evaluation of headache  My initial evaluation 2022     Migraine without aura and without status migrainosus, not intractable  She reports a long history of headaches and migraines dating back to early teens as well as a family history of migraines in mom  She reports pain is typically bilateral parietal and can be retro-orbital and occipital bilaterally  She denies aura and reports typical associated migrainous features without unilateral autonomic features  - As of 2022 she reports approximately 1 migraine a week, but can last up to 3 days  Will increase sumatriptan 50 mg which is helping some but not enough, to 100 mg and if still not helpful will add trial of rizatriptan next for rescue  She is not currently interested in prescription preventative, listed headache preventative supplements she could try if she would like  Workup:  - Neurologic exam was unremarkable for concerning findings  - non contrast head CT 12/15/18: No acute intracranial abnormality   - With no new or concerning symptoms, no red flags, unremarkable neurologic exam, there be no specific indication for further evaluation with MRI brain  However, could obtain at any time if indicated       Preventative:  - we discussed headache hygiene and lifestyle factors that may improve headaches  - She is not currently interested in prescription preventative, listed headache preventative supplements she could try if she would like  - Currently on through other providers: Lexapro/escitalopram  - Past/ failed/contraindicated:  Lexapro/escitalopram, topiramate side effects, amitriptyline and venlafaxine contraindicated interaction with current medications  - future options: propranolol discussed next, CGRP med such as emgality, botox    Rescue:  - recommend not taking over-the-counter or prescription pain medications more than 3 days per week to prevent medication overuse/rebound headache  -  Trial of   SUMAtriptan (IMITREX) 100 mg tablet; Take 1 tablet (100 mg total) by mouth once as needed for migraine May repeat x1 in 2 hours, max 2/24 hours, 3/week, 9/month  - Currently on through other providers: stopping sumatriptan 50 mg since not helping, ondansetron/Zofran for nausea, hydroxyzine as needed anxiety  - Past/ failed/contraindicated: stopping sumatriptan 50 mg since not helping  -Past/tolerated: Medrol Dosepak  - future options: Rizatriptan, prochlorperazine, Toradol IM or p o , could consider trial of 5 days of Depakote 500 mg nightly or dexamethasone 2 mg daily for prolonged migraine, ubrelvy, reyvow, nurtec      Patient instructions         Headache/migraine treatment:   Rescue medications (for immediate treatment of a headache): It is ok to take ibuprofen, acetaminophen or naproxen (Advil, Tylenol,  Aleve, Excedrin) if they help your headaches you should limit these to No more than 3 times a week to avoid medication overuse/rebound headaches  For your more moderate to severe migraines take this medication early   -     SUMAtriptan (IMITREX) 100 mg tablet; Take 1 tablet (100 mg total) by mouth once as needed for migraine May repeat x1 in 2 hours, max 2/24 hours, 3/week, 9/month      Next option if needed would be:  Maxalt (rizatriptan) 10mg tabs - take one at the onset of headache  May repeat one time after 2 hours if pain has not resolved     (Max 2 a day and 9 a month)     Over the counter preventive supplements for headaches/migraines (if you try, try for 3 months straight)  (to take every day to help prevent headaches - not to take at the time of headache): There are combo pills online of these - none of which regulated by FDA and double check dosing - take appropriate dose only once a day- *preventa migraine, migravent, mind ease, migrelief   [] Magnesium 400mg daily (If any diarrhea or upset stomach, decrease dose  as tolerated)  [] Riboflavin (Vitamin B2) 400mg daily - try online   (FYI B2 may make your urine bright/neon yellow)  AND/OR  [] Herbal medication: Petasites/Butterbur 150 mg daily - try online  (When choosing your Butterbur online or in the store, beware that there are some poor preps containing pyrrolizidine alkaloids (PAs) that can be harmful to the liver  Therefore, do not use butterbur products that are not labeled as PA-free )    Sleep and headache prevention:   [] Melatonin - you may take 1-3 mg nightly for sleep or headache prevention  You should take this 1 hour prior to bedtime consistently every night for it to work  It works by gradually helping to adjust your sleep time over days to weeks, rather than immediately making you feel sleepy  Prescription preventive medications for headaches/migraines   (to take every day to help prevent headaches - not to take at the time of headache):  [x] we discussed if you ever needed a prescription preventative we would trial a blood pressure pill and if this did not work or side effects then   ------------------  Emgality/Galcanezumab - the 1st dose is 240 mg loading dose of 2 consecutive 120 mg injections  Thereafter, 120 mg injections every 30 days    If needed there is a coupon card for the copay at [employer]     READ INSTRUCTIONS that come with the medication  REFRIGERATE  Keep out of direct sunlight  Prior to administration, allow to come to room temperature for 30 minutes   Do not warm using a heat source (eg, microwave or hot water)  Do not shake  Administer in preferably abdomen (avoiding 2 inches around the navel), thigh, upper arm, or buttocks avoiding areas of skin that are tender, bruised, red or hard  Deliver entire contents of single-use prefilled pen or syringe  Unknown impact in pregnancy therefore would recommend stopping 6 months prior to considering pregnancy  *Typically these types of medications take time until you see the benefit, although some may see improvement in days, often it may take weeks, especially if the medication is being titrated up to a beneficial level  Please contact us if there are any concerns or questions regarding the medication  Lifestyle Recommendations:  [x] SLEEP - Maintain a regular sleep schedule: Adults need at least 7-8 hours of uninterrupted a night  Maintain good sleep hygiene:  Going to bed and waking up at consistent times, avoiding excessive daytime naps, avoiding caffeinated beverages in the evening, avoid excessive stimulation in the evening and generally using bed primarily for sleeping  One hour before bedtime would recommend turning lights down lower, decreasing your activity (may read quietly, listen to music at a low volume)  When you get into bed, should eliminate all technology (no texting, emailing, playing with your phone, iPad or tablet in bed)  [x] HYDRATION - Maintain good hydration  Drink  2L of fluid a day (4 typical small water bottles)  [x] DIET - Maintain good nutrition  In particular don't skip meals and try and eat healthy balanced meals regularly  [x] TRIGGERS - Look for other triggers and avoid them: Limit caffeine to 1-2 cups a day or less  Avoid dietary triggers that you have noticed bring on your headaches (this could include aged cheese, peanuts, MSG, aspartame and nitrates)    [x] EXERCISE - physical exercise as we all know is good for you in many ways, and not only is good for your heart, but also is beneficial for your mental health, cognitive health and  chronic pain/headaches  I would encourage at the least 5 days of physical exercise weekly for at least 30 minutes  Education and Follow-up  [x] Please call with any questions or concerns  Of course if any new concerning symptoms go to the emergency department  [x] Follow up 3-4 months, sooner if needed         CC: We had the pleasure of evaluating Kole Gordon in neurological consultation today  Kole Gordon is a   right handed female who presents today for evaluation of headaches  History obtained from patient as well as available medical record review  History of Present Illness:   Current medical illnesses  or past medical history include migraine, anxiety, panic disorder, moderately severe major depression, Normocytic anemia, thyroid nodules, GERD, scoliosis, costochondritis (7-8 months post viral illness early 2020), sinus Tarsi syndrome of left ankle, vertigo       Headaches started at what age? [age] years old  How often do the headaches occur?   - as of 12/19/2022: She reports on average 1 migraine a week that can last 2-3 days for the past 2-3 years, 12 days a month    typically improves the sumatriptan over a few hours   What time of the day do the headaches start? No particular time of day   How long do the headaches last? Over 4 hours without meds up to days  Are you ever headache free? Yes     Aura? without aura     Last eye exam: not in years     Where is your headache located and pain quality?   - usually bilateral, parietal - sharp, pulsating, pressure  Sometimes can go retro-orbital - bilateral, Left > right  Can also be mid occipital     What is the intensity of pain?  Average: 6-7/10, worst 8 5/10  Associated symptoms:   [x] Nausea       [] Vomiting   - not typically       [x] Photophobia     [x]Phonophobia      [x] Osmophobia  [] Blurred vision    [x] Light-headed or dizzy  - sometimes, not often    [] Tinnitus   [] Hands or feet tingle or feel numb/paresthesias    [] Ptosis      [] Facial droop  [] Lacrimation  [] Nasal congestion/rhinorrhea      Things that make the headache worse? No specific movements    Headache triggers:  Unknown except for stress, more at college     Have you seen someone else for headaches or pain? Yes, PCP  Have you had trigger point injection performed and how often? No  Have you had Botox injection performed and how often? No   Have you had epidural injections or transforaminal injections performed? No  Are you current pregnant or planning on getting pregnant? Not for years, on [facility]   Have you ever had any Brain imaging? yes     What medications do you take or have you taken for your headaches? ABORTIVE/pertinent p r n   Meds:    OTC medications have been ineffective     Sumatriptan/Imitrex 50 mg - no SE   Ondansetron/Zofran for nausea    Past  Medrol Dosepak 2018 tolerated    PREVENTIVE/pertinent daily meds:     Lexapro/escitalopram 20 mg for about 2 years  Hydroxyzine as needed anxiety    Past/ failed/contraindicated:  Amitriptyline and venlafaxine would be contraindicated interaction with current medications  Headache preventative supplements 2018  topiramate - so nauseated could not ear for days and brain fog badly       LIFESTYLE  Sleep   - averages: 6-7 hours   Problems falling asleep?:   Yes, usually   Problems staying asleep?:  Not too much except for head lights     Physical activity:   Intramural soccer   snowboarding every weekend when home    Water: 2 large per day - 64 oz   Caffeine: 1-2 monster a week     Mood:  anxiety, depression - better on lexapro, in counseling now in person     The following portions of the patient's history were reviewed and updated as appropriate: allergies, current medications, past family history, past medical history, past social history, past surgical history and problem list     Pertinent family history:  Family history of headaches:  migraine headaches in mother  Any family history of aneurysms - No    Pertinent social history:  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   Education: [employer] in [employer], [employer] - environmental science - 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  Lives with parents at home and roommates at college      Past Medical History:     Past Medical History:   Diagnosis Date   • Hordeolum externum     unspecified laterality Last assessed: 5/1/2014   • Scoliosis     description: 6 3 degrees in 3/2013   • Walking pneumonia     last assessed: 1/20/2016       Patient Active Problem List   Diagnosis   • Dysmenorrhea   • Irregular menses   • Recurrent UTI   • Scoliosis   • Multiple thyroid nodules   • Cough   • Shortness of breath   • Costochondritis, acute   • Migraine without aura and without status migrainosus, not intractable   • JACOB (generalized anxiety disorder)   • Mild depression       Medications:      Current Outpatient Medications   Medication Sig Dispense Refill   • albuterol (PROVENTIL HFA,VENTOLIN HFA) 90 mcg/act inhaler Inhale 2 puffs every 4 (four) hours as needed for wheezing or shortness of breath (cough) 1 Inhaler 0   • escitalopram (LEXAPRO) 20 mg tablet TAKE 1 TABLET BY MOUTH EVERY DAY 90 tablet 1   • hydrOXYzine HCL (ATARAX) 25 mg tablet Take 1 tablet (25 mg total) by mouth every 6 (six) hours as needed for anxiety 30 tablet 0   • Junel FE 1/20 1-20 MG-MCG per tablet Take 1 tablet by mouth daily 84 tablet 3   • ondansetron (ZOFRAN) 4 mg tablet Take 1-2 tablets by mouth as needed     • SUMAtriptan (IMITREX) 100 mg tablet Take 1 tablet (100 mg total) by mouth once as needed for migraine May repeat x1 in 2 hours, max 2/24 hours, 3/week, 9/month 9 tablet 12   • triamcinolone (KENALOG) 0 1 % ointment Apply topically 2 (two) times a day 30 g 0     No current facility-administered medications for this visit          Allergies:    No Known Allergies    Family History:     Family History Problem Relation Age of Onset   • Migraines Mother    • No Known Problems Father    • Bone cancer Family    • Heart disease Family    • Lung cancer Maternal Grandmother    • Multiple sclerosis Maternal Grandmother    • No Known Problems Paternal Grandmother    • Heart disease Paternal Grandfather        Social History:       Social History     Socioeconomic History   • Marital status: Single     Spouse name: Not on file   • Number of children: Not on file   • Years of education: Not on file   • Highest education level: Not on file   Occupational History   • Not on file   Tobacco Use   • Smoking status: Never   • Smokeless tobacco: Never   • Tobacco comments:     no smoke exposure   Vaping Use   • Vaping Use: Never used   Substance and Sexual Activity   • Alcohol use: Not Currently     Alcohol/week: 2 0 standard drinks     Types: 2 Standard drinks or equivalent per week   • Drug use: Never   • Sexual activity: Never     Birth control/protection: OCP   Other Topics Concern   • Not on file   Social History Narrative    Living with parents - parents , younger brother, younger sister    Always uses seat belt    Caffeine use    Completed 12 th grade, [employer], 2020; will attend college in [place] in 2020    Feels safe at home     - denied     Has carbon monoxide detectors in home    Has smoke detectors    Pets:Cat    Student     Social Determinants of Health     Financial Resource Strain: Not on file   Food Insecurity: Not on file   Transportation Needs: Not on file   Physical Activity: Not on file   Stress: Not on file   Social Connections: Not on file   Intimate Partner Violence: Not on file   Housing Stability: Not on file         Objective:       Physical Exam:                                                                 Vitals:            Constitutional:    /83 (BP Location: Left arm, Patient Position: Sitting, Cuff Size: Standard)   Pulse 86   Temp 98 4 °F (36 9 °C) (Tympanic)   Ht 5' 2" (1 575 m)   Wt 61 2 kg (135 lb)   BMI 24 69 kg/m²   BP Readings from Last 3 Encounters:   12/19/22 127/83   07/22/22 110/72   07/20/22 124/68     Pulse Readings from Last 3 Encounters:   12/19/22 86   07/20/22 77   05/16/22 84         Well developed, well nourished, well groomed  No dysmorphic features  HEENT:  Normocephalic atraumatic  Oropharynx appears clear and moist  No oral mucosal lesions noted  Chest:  Respirations appear regular and unlabored  Cardiovascular:  Distal extremities warm without palpable edema or tenderness, no observed significant swelling  Musculoskeletal:  (see below under neurologic exam for evaluation of motor function and gait)   Skin:  warm and dry  No apparent birthmarks or stigmata of neurocutaneous disease  Psychiatric:  Normal behavior and appropriate affect        Neurological Examination:     Mental status/cognitive function:   Recent and remote memory intact  Attention span and concentration as well as fund of knowledge are appropriate for age  Normal language and spontaneous speech  Cranial Nerves:  II-visual fields full  Fundi poorly visualized due to pupillary constriction  III, IV, VI-Pupils were equal, round, and reactive to light and accommodation  Extraocular movements were full and conjugate without nystagmus  V-facial sensation symmetric  VII-facial expression symmetric, intact forehead wrinkle, strong eye closure, symmetric smile    VIII-hearing grossly intact bilaterally   IX, X-palate elevation symmetric, no dysarthria  XI-shoulder shrug strength intact    XII-tongue protrusion midline  Motor Exam: symmetric bulk and tone throughout, no pronator drift  Power/strength 5/5 bilateral upper and lower extremities, no atrophy, fasciculations or abnormal movements noted  Sensory: grossly intact light touch in all extremities  Reflexes: brachioradialis 2+, biceps 2+, knee 2+, ankle 2+ bilaterally   No ankle clonus   Coordination: Finger nose finger intact bilaterally, no apparent dysmetria, ataxia or tremor noted  Gait: steady casual and tandem gait  Pertinent lab results:   - 11/11/22: CBC unremarkable except for hemoglobin 11 7 MCV 88  CMP unremarkable except for chloride 109, glucose 114, calcium 8 1  - 2/13/2019 TSH normal     Imaging: I have personally reviewed imaging and radiology read   - non contrast head CT 12/15/18: No acute intracranial abnormality  Review of Systems:   ROS obtained by medical assistant Personally reviewed and updated if indicated  I recommended PCP follow up for non neurologic problems  Review of Systems   Constitutional: Negative for appetite change and fever  HENT: Negative  Negative for hearing loss, tinnitus, trouble swallowing and voice change  Eyes: Negative  Negative for photophobia and pain  Respiratory: Negative  Negative for shortness of breath  Cardiovascular: Negative  Negative for palpitations  Gastrointestinal: Negative  Negative for nausea and vomiting  Endocrine: Negative  Negative for cold intolerance  Genitourinary: Negative  Negative for dysuria, frequency and urgency  Musculoskeletal: Negative  Negative for myalgias and neck pain  Skin: Negative  Negative for rash  Neurological: Negative  Negative for dizziness, tremors, seizures, syncope, facial asymmetry, speech difficulty, weakness, light-headedness, numbness and headaches  Hematological: Negative  Does not bruise/bleed easily  Psychiatric/Behavioral: Negative  Negative for confusion, hallucinations and sleep disturbance  All other systems reviewed and are negative      I have spent 57 minutes with Patient today in which greater than 50% of this time was spent in counseling/coordination of care regarding Diagnostic results, Prognosis, Risks and benefits of tx options, Intructions for management, Patient and family education, Importance of tx compliance, Risk factor reductions and Impressions  I also spent 25 minutes non face to face for this patient the same day           Author:  Dinesh Hansen MD 12/19/2022 1:33 PM

## 2022-12-19 NOTE — PATIENT INSTRUCTIONS
Headache/migraine treatment:   Rescue medications (for immediate treatment of a headache): It is ok to take ibuprofen, acetaminophen or naproxen (Advil, Tylenol,  Aleve, Excedrin) if they help your headaches you should limit these to No more than 3 times a week to avoid medication overuse/rebound headaches  For your more moderate to severe migraines take this medication early   -     SUMAtriptan (IMITREX) 100 mg tablet; Take 1 tablet (100 mg total) by mouth once as needed for migraine May repeat x1 in 2 hours, max 2/24 hours, 3/week, 9/month      Next option would be:  Maxalt (rizatriptan) 10mg tabs - take one at the onset of headache  May repeat one time after 2 hours if pain has not resolved  (Max 2 a day and 9 a month)     Over the counter preventive supplements for headaches/migraines (if you try, try for 3 months straight)  (to take every day to help prevent headaches - not to take at the time of headache): There are combo pills online of these - none of which regulated by FDA and double check dosing - take appropriate dose only once a day- *preventa migraine, migravent, mind ease, migrelief   [] Magnesium 400mg daily (If any diarrhea or upset stomach, decrease dose  as tolerated)  [] Riboflavin (Vitamin B2) 400mg daily - try online   (FYI B2 may make your urine bright/neon yellow)  AND/OR  [] Herbal medication: Petasites/Butterbur 150 mg daily - try online  (When choosing your Butterbur online or in the store, beware that there are some poor preps containing pyrrolizidine alkaloids (PAs) that can be harmful to the liver  Therefore, do not use butterbur products that are not labeled as PA-free )    Sleep and headache prevention:   [] Melatonin - you may take 1-3 mg nightly for sleep or headache prevention  You should take this 1 hour prior to bedtime consistently every night for it to work   It works by gradually helping to adjust your sleep time over days to weeks, rather than immediately making you feel sleepy  Prescription preventive medications for headaches/migraines   (to take every day to help prevent headaches - not to take at the time of headache):  [x] we discussed if you ever needed a prescription preventative we would trial a blood pressure pill and if this did not work or side effects then   ------------------  Emgality/Galcanezumab - the 1st dose is 240 mg loading dose of 2 consecutive 120 mg injections  Thereafter, 120 mg injections every 30 days    If needed there is a coupon card for the copay at [employer]  com     READ INSTRUCTIONS that come with the medication  REFRIGERATE  Keep out of direct sunlight  Prior to administration, allow to come to room temperature for 30 minutes  Do not warm using a heat source (eg, microwave or hot water)  Do not shake  Administer in preferably abdomen (avoiding 2 inches around the navel), thigh, upper arm, or buttocks avoiding areas of skin that are tender, bruised, red or hard  Deliver entire contents of single-use prefilled pen or syringe  Unknown impact in pregnancy therefore would recommend stopping 6 months prior to considering pregnancy  *Typically these types of medications take time until you see the benefit, although some may see improvement in days, often it may take weeks, especially if the medication is being titrated up to a beneficial level  Please contact us if there are any concerns or questions regarding the medication  Lifestyle Recommendations:  [x] SLEEP - Maintain a regular sleep schedule: Adults need at least 7-8 hours of uninterrupted a night  Maintain good sleep hygiene:  Going to bed and waking up at consistent times, avoiding excessive daytime naps, avoiding caffeinated beverages in the evening, avoid excessive stimulation in the evening and generally using bed primarily for sleeping    One hour before bedtime would recommend turning lights down lower, decreasing your activity (may read quietly, listen to music at a low volume)  When you get into bed, should eliminate all technology (no texting, emailing, playing with your phone, iPad or tablet in bed)  [x] HYDRATION - Maintain good hydration  Drink  2L of fluid a day (4 typical small water bottles)  [x] DIET - Maintain good nutrition  In particular don't skip meals and try and eat healthy balanced meals regularly  [x] TRIGGERS - Look for other triggers and avoid them: Limit caffeine to 1-2 cups a day or less  Avoid dietary triggers that you have noticed bring on your headaches (this could include aged cheese, peanuts, MSG, aspartame and nitrates)  [x] EXERCISE - physical exercise as we all know is good for you in many ways, and not only is good for your heart, but also is beneficial for your mental health, cognitive health and  chronic pain/headaches  I would encourage at the least 5 days of physical exercise weekly for at least 30 minutes  Education and Follow-up  [x] Please call with any questions or concerns  Of course if any new concerning symptoms go to the emergency department    [x] Follow up 3-4 months, sooner if needed

## 2023-01-07 ENCOUNTER — PATIENT MESSAGE (OUTPATIENT)
Dept: FAMILY MEDICINE CLINIC | Facility: CLINIC | Age: 23
End: 2023-01-07

## 2023-01-07 ENCOUNTER — PATIENT MESSAGE (OUTPATIENT)
Dept: NEUROLOGY | Facility: CLINIC | Age: 23
End: 2023-01-07

## 2023-01-07 DIAGNOSIS — G43.009 MIGRAINE WITHOUT AURA AND WITHOUT STATUS MIGRAINOSUS, NOT INTRACTABLE: Primary | ICD-10-CM

## 2023-01-09 DIAGNOSIS — G43.009 MIGRAINE WITHOUT AURA AND WITHOUT STATUS MIGRAINOSUS, NOT INTRACTABLE: Primary | ICD-10-CM

## 2023-01-09 RX ORDER — PROPRANOLOL HYDROCHLORIDE 20 MG/1
TABLET ORAL
Qty: 60 TABLET | Refills: 1 | Status: SHIPPED | OUTPATIENT
Start: 2023-01-09 | End: 2023-05-25 | Stop reason: ALTCHOICE

## 2023-01-26 ENCOUNTER — TELEPHONE (OUTPATIENT)
Dept: NEUROLOGY | Facility: CLINIC | Age: 23
End: 2023-01-26

## 2023-01-26 NOTE — TELEPHONE ENCOUNTER
Received fax from [employer] [phone]  Emgality requires PA  Key [id number]    PA initiated on [facility]  Message from [employer]  A new prior authorization (PA) request cannot be started at this time because there is a request already open for this drug  Please contact the PA call center if you have questions on the status of this request     Called PA dept [phone], spoke w/ Popeye Garibay and advised of the above  States that there is PA on file  Unfortunately he couldn't tell me who initiated it   It is currently waiting for approval

## 2023-01-30 NOTE — TELEPHONE ENCOUNTER
Per [employer] message dated 1/7/23-Emgality PA has been denied  Called PA dept [phone], spoke w/ Marcellus Ortiz who transferred me to [phone]  Spoke w/ Annabella Edwards and advised of the below and above  States that the member might have initiated the PA  I was transferred to another dept  Spoke w/ Toya Avalos and states PA was initiated by the pt but they needed additional clinical questions  States that next step is to initiate redetermination for the loading dose  Once the loading dose is picked up  Pt is to call our office so we can initiate PA for the maintenance dose  Urgent Redetermination for emgality SOAJ loading dose initiated via phone    -2 pens for the first month (loading dose)   -dx Migraine without aura and without status migrainosus, not intractable (G43 [id number])  -migraine prophylaxis   -Past/failed/contraindicated:  Lexapro/escitalopram,   topiramate side effects, propranolol side effects  Amitriptyline and venlafaxine contraindicated interaction with current medications  -episodic migraines   -not using in combination w/ botox   -not using in combination w/ CGRP agent  Case # [id number]  Loading dose has been approved through 4/30/23    Called [facility] pharmacy, spoke w/ Bruce Garcia and made aware of the approval  She got a paid claim  Copay $150       Pt made aware via [employer]

## 2023-05-25 ENCOUNTER — TELEMEDICINE (OUTPATIENT)
Dept: NEUROLOGY | Facility: CLINIC | Age: 23
End: 2023-05-25

## 2023-05-25 DIAGNOSIS — G43.009 MIGRAINE WITHOUT AURA AND WITHOUT STATUS MIGRAINOSUS, NOT INTRACTABLE: ICD-10-CM

## 2023-05-25 NOTE — PROGRESS NOTES
Virtual Regular Visit    Verification of patient location:    Patient is located at Home in the following state in which I hold an active license [place]      Assessment/Plan:    Problem List Items Addressed This Visit        Cardiovascular and Mediastinum    Migraine without aura and without status migrainosus, not intractable    Relevant Medications    Galcanezumab-gnlm 120 MG/ML SOAJ            Reason for visit is No chief complaint on file  Encounter provider Christelle Luna MD    Provider located at [facility]  [address] [place] [address] [place] [id number]      Recent Visits  No visits were found meeting these conditions  Showing recent visits within past 7 days and meeting all other requirements  Today's Visits  Date Type Provider Dept   05/25/23 Telemedicine Christelle Luna MD [facility] [address] today's visits and meeting all other requirements  Future Appointments  No visits were found meeting these conditions  Showing future appointments within next 150 days and meeting all other requirements       The patient was identified by name and date of birth  Ling Samaniego was informed that this is a telemedicine visit and that the visit is being conducted through the [facility] [employer]  She agrees to proceed     My office door was closed  The patient was notified the following individuals were present in the room MIGDALIA Mcfadden and Dr Alysia Rouse  She acknowledged consent and understanding of privacy and security of the video platform  The patient has agreed to participate and understands they can discontinue the visit at any time  Patient is aware this is a billable service       Subjective    Assessment/Plan:   Ling Samaniego is a delightful  26 y o  female with a past medical history that includes migraine, anxiety, panic disorder, moderately severe major depression, Normocytic anemia, thyroid nodules, GERD, scoliosis, costochondritis (7-8 months post viral illness early 2020), sinus Tarsi syndrome of left ankle, vertigo, sleep onset insomnia referred here for evaluation of headache  My initial evaluation 12/19/2022     Migraine without aura and without status migrainosus, not intractable  She reports a long history of headaches and migraines dating back to early teens as well as a family history of migraines in mom  She reports pain is typically bilateral parietal and can be retro-orbital and occipital bilaterally  She denies aura and reports typical associated migrainous features without unilateral autonomic features  - As of 12/19/2022 she reports approximately 1 migraine a week, but can last up to 3 days  Will increase sumatriptan 50 mg which is helping some but not enough, to 100 mg and if still not helpful will add trial of rizatriptan next for rescue  She is not currently interested in prescription preventative, listed headache preventative supplements she could try if she would like  - as of 5/25/2023: She reports significant improvement on Emgality with approximately 2 migraines a month that resolved with ibuprofen  Tried and failed propranolol  Sumatriptan 100 mg works better than 50 mg but she is not even needing it much anymore since they are so much better  Workup:  - Neurologic exam was unremarkable for concerning findings  - non contrast head CT 12/15/18: No acute intracranial abnormality   - With no new or concerning symptoms, no red flags, unremarkable neurologic exam, there be no specific indication for further evaluation with MRI brain  However, could obtain at any time if indicated  Preventative:  - we discussed headache hygiene and lifestyle factors that may improve headaches  -  continue   Galcanezumab-gnlm 120 MG/ML SOAJ; Inject 120 mg under the skin every 28 days  Discussed proper use, possible side effects and risks    - Currently on through other providers: Lexapro/escitalopram 20 mg   - Past/ failed/contraindicated:  Lexapro/escitalopram, topiramate side effects, amitriptyline and venlafaxine contraindicated interaction with current medications  - future options: Alternative CGRP med, botox    Rescue:  - recommend not taking over-the-counter or prescription pain medications more than 3 days per week to prevent medication overuse/rebound headache  -   SUMAtriptan (IMITREX) 100 mg tablet; Take 1 tablet (100 mg total) by mouth once as needed for migraine May repeat x1 in 2 hours, max 2/24 hours, 3/week, 9/month  Discussed proper use, possible side effects and risks  - Currently on through other providers: Ibuprofen/Advil helps, ondansetron/Zofran for nausea, hydroxyzine as needed anxiety  - Past/ failed/contraindicated: stopping sumatriptan 50 mg since not helping  -Past/tolerated: Medrol Dosepak  - future options: Rizatriptan, prochlorperazine, Toradol IM or p o , could consider trial of 5 days of Depakote 500 mg nightly or dexamethasone 2 mg daily for prolonged migraine, ubrelvy, reyvow, nurtec      Patient instructions         Headache/migraine treatment:   Rescue medications (for immediate treatment of a headache): It is ok to take ibuprofen, acetaminophen or naproxen (Advil, Tylenol,  Aleve, Excedrin) if they help your headaches you should limit these to No more than 3 times a week to avoid medication overuse/rebound headaches  For your more moderate to severe migraines take this medication early   -     SUMAtriptan (IMITREX) 100 mg tablet; Take 1 tablet (100 mg total) by mouth once as needed for migraine May repeat x1 in 2 hours, max 2/24 hours, 3/week, 9/month      Next option if needed would be:  Maxalt (rizatriptan) 10mg tabs - take one at the onset of headache  May repeat one time after 2 hours if pain has not resolved     (Max 2 a day and 9 a month)     Over the counter preventive supplements for headaches/migraines (if you try, try for 3 months straight)  (to take every day to help prevent headaches - not to take at the time of headache): There are combo pills online of these - none of which regulated by FDA and double check dosing - take appropriate dose only once a day- *preventa migraine, migravent, mind ease, migrelief   [] Magnesium 400mg daily (If any diarrhea or upset stomach, decrease dose  as tolerated)  [] Riboflavin (Vitamin B2) 400mg daily - try online   (FYI B2 may make your urine bright/neon yellow)  AND/OR  [] Herbal medication: Petasites/Butterbur 150 mg daily - try online  (When choosing your Butterbur online or in the store, beware that there are some poor preps containing pyrrolizidine alkaloids (PAs) that can be harmful to the liver  Therefore, do not use butterbur products that are not labeled as PA-free )    Sleep and headache prevention:   [] Melatonin - you may take 1-3 mg nightly for sleep or headache prevention  You should take this 1 hour prior to bedtime consistently every night for it to work  It works by gradually helping to adjust your sleep time over days to weeks, rather than immediately making you feel sleepy  Prescription preventive medications for headaches/migraines   (to take every day to help prevent headaches - not to take at the time of headache):  [x]   Emgality/Galcanezumab - the 1st dose is 240 mg loading dose of 2 consecutive 120 mg injections  Thereafter, 120 mg injections every 30 days    If needed there is a coupon card for the copay at [employer]     READ INSTRUCTIONS that come with the medication  REFRIGERATE  Keep out of direct sunlight  Prior to administration, allow to come to room temperature for 30 minutes  Do not warm using a heat source (eg, microwave or hot water)  Do not shake  Administer in preferably abdomen (avoiding 2 inches around the navel), thigh, upper arm, or buttocks avoiding areas of skin that are tender, bruised, red or hard  Deliver entire contents of single-use prefilled pen or syringe    Unknown impact in pregnancy therefore would recommend stopping 6 months prior to considering pregnancy  *Typically these types of medications take time until you see the benefit, although some may see improvement in days, often it may take weeks, especially if the medication is being titrated up to a beneficial level  Please contact us if there are any concerns or questions regarding the medication  Lifestyle Recommendations:  [x] SLEEP - Maintain a regular sleep schedule: Adults need at least 7-8 hours of uninterrupted a night  Maintain good sleep hygiene:  Going to bed and waking up at consistent times, avoiding excessive daytime naps, avoiding caffeinated beverages in the evening, avoid excessive stimulation in the evening and generally using bed primarily for sleeping  One hour before bedtime would recommend turning lights down lower, decreasing your activity (may read quietly, listen to music at a low volume)  When you get into bed, should eliminate all technology (no texting, emailing, playing with your phone, iPad or tablet in bed)  [x] HYDRATION - Maintain good hydration  Drink  2L of fluid a day (4 typical small water bottles)  [x] DIET - Maintain good nutrition  In particular don't skip meals and try and eat healthy balanced meals regularly  [x] TRIGGERS - Look for other triggers and avoid them: Limit caffeine to 1-2 cups a day or less  Avoid dietary triggers that you have noticed bring on your headaches (this could include aged cheese, peanuts, MSG, aspartame and nitrates)  [x] EXERCISE - physical exercise as we all know is good for you in many ways, and not only is good for your heart, but also is beneficial for your mental health, cognitive health and  chronic pain/headaches  I would encourage at the least 5 days of physical exercise weekly for at least 30 minutes  Education and Follow-up  [x] Please call with any questions or concerns   Of course if any new concerning symptoms go to the emergency department  [x] Follow up 3-4 months, sooner if needed         CC: We had the pleasure of evaluating Ling Samaniego in neurological consultation today  Ling Samaniego is a   right handed female who presents today for evaluation of headaches  History obtained from patient as well as available medical record review  History of Present Illness:   Interval history as of 5/25/2023  - no significant new or concerning neurologic symptoms since last visit     Headaches and migraines   She reports significant improvement with approximately 2 migraines a month and ibuprofen resolves them    Preventative:   -Trial of propranolol-side effects  -trial of Emgality sent 1/18/2023- [place] and she had 2 shots beginning of February  She ran out of [place] in February she was very stressed and it was out of stock and she had to play phone tag to find it, she attempted to give her dose 2 weeks late and she started also self with the injector and ripped it out before 10 seconds was up and did not get all the injected medicine and she got a replacement from a company and it malfunctioned again    Abortive:   Trial of sumatriptan 100 mg-she wrote in 1/7/2023 reporting it helps but migraines can last 3 days  She reports now that Emgality is on board sumatriptan works much better but she has not needed it since now ibuprofen/Advil helps  Denies bothersome side effects        Headaches started at what age? [age] years old  How often do the headaches occur?   - as of 12/19/2022: She reports on average 1 migraine a week that can last 2-3 days for the past 2-3 years, 12 days a month    typically improves the sumatriptan over a few hours   What time of the day do the headaches start? No particular time of day   How long do the headaches last? Over 4 hours without meds up to days  Are you ever headache free?  Yes     Aura? without aura     Last eye exam: not in years     Where is your headache located and pain quality?   - usually bilateral, parietal - sharp, pulsating, pressure  Sometimes can go retro-orbital - bilateral, Left > right  Can also be mid occipital     What is the intensity of pain? Average: 6-7/10, worst 8 5/10  Associated symptoms:   [x] Nausea       [] Vomiting   - not typically       [x] Photophobia     [x]Phonophobia      [x] Osmophobia  [] Blurred vision    [x] Light-headed or dizzy  - sometimes, not often    [] Tinnitus   [] Hands or feet tingle or feel numb/paresthesias    [] Ptosis      [] Facial droop  [] Lacrimation  [] Nasal congestion/rhinorrhea      Things that make the headache worse? No specific movements    Headache triggers:  Unknown except for stress, more at college     Have you seen someone else for headaches or pain? Yes, PCP  Have you had trigger point injection performed and how often? No  Have you had Botox injection performed and how often? No   Have you had epidural injections or transforaminal injections performed? No  Are you current pregnant or planning on getting pregnant? Not for years, on [facility]   Have you ever had any Brain imaging? yes     What medications do you take or have you taken for your headaches? ABORTIVE/pertinent p r n   Meds:    OTC medications have been ineffective     Sumatriptan/Imitrex 50 mg - no SE   Ondansetron/Zofran for nausea    Past  Medrol Dosepak 2018 tolerated    PREVENTIVE/pertinent daily meds:     Lexapro/escitalopram 20 mg for about 2 years  Hydroxyzine as needed anxiety    Past/ failed/contraindicated:  Amitriptyline and venlafaxine would be contraindicated interaction with current medications  Headache preventative supplements 2018  topiramate - so nauseated could not ear for days and brain fog badly       LIFESTYLE  Sleep   - averages: 6-7 hours   Problems falling asleep?:   Yes, usually   Problems staying asleep?:  Not too much except for head lights     Physical activity:   Intramural soccer   snowboarding every weekend when home    Water: 2 large per day - 64 oz Caffeine: 1-2 monster a week     Mood:  anxiety, depression - better on lexapro, in counseling now in person     The following portions of the patient's history were reviewed and updated as appropriate: allergies, current medications, past family history, past medical history, past social history, past surgical history and problem list     Pertinent family history:  Family history of headaches:  migraine headaches in mother  Any family history of aneurysms - No    Pertinent social history:  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   Education: [employer] in [employer], [address] [place] [address] [place] - environmental science - 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  Lives with parents at home and roommates at [employer]      Past Medical History:   Diagnosis Date   • Hordeolum externum     unspecified laterality Last assessed: 5/1/2014   • Scoliosis     description: 6 3 degrees in 3/2013   • Walking pneumonia     last assessed: 1/20/2016       Past Surgical History:   Procedure Laterality Date   • ANKLE SURGERY Left 04/2017       Current Outpatient Medications   Medication Sig Dispense Refill   • albuterol (PROVENTIL HFA,VENTOLIN HFA) 90 mcg/act inhaler Inhale 2 puffs every 4 (four) hours as needed for wheezing or shortness of breath (cough) 1 Inhaler 0   • escitalopram (LEXAPRO) 20 mg tablet TAKE 1 TABLET BY MOUTH EVERY DAY 90 tablet 1   • Galcanezumab-gnlm 120 MG/ML SOAJ Inject 120 mg under the skin every 28 days 3 mL 4   • hydrOXYzine HCL (ATARAX) 25 mg tablet Take 1 tablet (25 mg total) by mouth every 6 (six) hours as needed for anxiety 30 tablet 0   • Junel FE 1/20 1-20 MG-MCG per tablet Take 1 tablet by mouth daily 84 tablet 3   • ondansetron (ZOFRAN) 4 mg tablet Take 1-2 tablets by mouth as needed     • SUMAtriptan (IMITREX) 100 mg tablet Take 1 tablet (100 mg total) by mouth once as needed for migraine May repeat x1 in 2 hours, max 2/24 hours, 3/week, 9/month 9 tablet 12 • triamcinolone (KENALOG) 0 1 % ointment Apply topically 2 (two) times a day 30 g 0     No current facility-administered medications for this visit  No Known Allergies      Objective:     Exam limited by Video  Physical Exam:                                                                 Vitals:            Constitutional:    There were no vitals taken for this visit  BP Readings from Last 3 Encounters:   12/19/22 127/83   07/22/22 110/72   07/20/22 124/68     Pulse Readings from Last 3 Encounters:   12/19/22 86   07/20/22 77   05/16/22 84         Well developed, well nourished, No dysmorphic features  HEENT:  Normocephalic atraumatic  See neuro exam   Psychiatric:  Normal behavior and appropriate affect        Neurological Examination:     Mental status/cognitive function:   Recent and remote memory appear intact  Attention span and concentration as well as fund of knowledge appear appropriate for age  Normal language and spontaneous speech  Cranial Nerves:  VII-facial expression symmetric  Motor Exam: symmetric bulk throughout  no atrophy, fasciculations or abnormal movements noted  Coordination:  no apparent dysmetria, ataxia or tremor noted      Pertinent lab results:   - 11/11/22: CBC unremarkable except for hemoglobin 11 7 MCV 88  CMP unremarkable except for chloride 109, glucose 114, calcium 8 1  - 2/13/2019 TSH normal     Imaging: I have personally reviewed imaging and radiology read   - non contrast head CT 12/15/18: No acute intracranial abnormality         Review of Systems:   ROS obtained by medical assistant Personally reviewed and updated if indicated  I recommended PCP follow up for non neurologic problems  Review of Systems   Constitutional: Negative for appetite change and fever  HENT: Negative  Negative for hearing loss, tinnitus, trouble swallowing and voice change  Eyes: Negative  Negative for photophobia and pain  Respiratory: Negative    Negative for shortness of breath  Cardiovascular: Negative  Negative for palpitations  Gastrointestinal: Negative  Negative for nausea and vomiting  Endocrine: Negative  Negative for cold intolerance  Genitourinary: Negative  Negative for dysuria, frequency and urgency  Musculoskeletal: Negative  Negative for myalgias and neck pain  Skin: Negative  Negative for rash  Neurological: Positive for headaches  Negative for dizziness, tremors, seizures, syncope, facial asymmetry, speech difficulty, weakness, light-headedness and numbness  Hematological: Negative  Does not bruise/bleed easily  Psychiatric/Behavioral: Negative  Negative for confusion, hallucinations and sleep disturbance  All other systems reviewed and are negative  I have spent 8 minutes with Patient today in which greater than 50% of this time was spent in counseling/coordination of care regarding Prognosis, Risks and benefits of tx options, Instructions for management, Importance of tx compliance, Risk factor reductions, Impressions, Counseling / Coordination of care, Documenting in the medical record and Obtaining or reviewing history    I also spent 13 minutes non face to face for this patient the same day           Visit Time  Total Visit Duration: 21

## 2023-06-12 ENCOUNTER — PATIENT MESSAGE (OUTPATIENT)
Dept: NEUROLOGY | Facility: CLINIC | Age: 23
End: 2023-06-12

## 2023-06-13 ENCOUNTER — TELEPHONE (OUTPATIENT)
Dept: NEUROLOGY | Facility: CLINIC | Age: 23
End: 2023-06-13

## 2023-06-13 NOTE — TELEPHONE ENCOUNTER
ZORAN bermudez at 16:21 23:    Patient called into to say that her Emgality requires a PA  It  in April of this year  She was just informed of this yesterday when patient went to  the medication  Patient said she was due for her monthly injection yesterday 23  Per Chart review patient also sent in My Chart message regarding the same:    2023  Bhanu Philippe:      23 10:50 PM  Good morning,     My emgality prescription Prior Authorization form has , and I require a new one to  this months dose   Sorry for the late notice but I was not notified until today when I went to pick it up      Thank you,  Bhanu Philippe    # [phone]

## 2023-06-14 DIAGNOSIS — F32.2 MODERATELY SEVERE MAJOR DEPRESSION (HCC): ICD-10-CM

## 2023-06-14 RX ORDER — ESCITALOPRAM OXALATE 20 MG/1
TABLET ORAL
Qty: 90 TABLET | Refills: 1 | Status: SHIPPED | OUTPATIENT
Start: 2023-06-14

## 2023-06-14 NOTE — TELEPHONE ENCOUNTER
Per EWELINA Case: [id number], Status: Approved, Coverage Starts on: 6/14/2023 12:00:00 AM, Coverage Ends on: 6/13/2024 12:00:00 AM      Left message for pharm making them aware of approval    mychart message sent to pt

## 2023-06-19 ENCOUNTER — OFFICE VISIT (OUTPATIENT)
Dept: FAMILY MEDICINE CLINIC | Facility: CLINIC | Age: 23
End: 2023-06-19
Payer: COMMERCIAL

## 2023-06-19 VITALS
TEMPERATURE: 98.1 F | BODY MASS INDEX: 23.92 KG/M2 | SYSTOLIC BLOOD PRESSURE: 112 MMHG | WEIGHT: 130 LBS | HEIGHT: 62 IN | OXYGEN SATURATION: 97 % | HEART RATE: 87 BPM | DIASTOLIC BLOOD PRESSURE: 64 MMHG

## 2023-06-19 DIAGNOSIS — F41.1 GAD (GENERALIZED ANXIETY DISORDER): Primary | ICD-10-CM

## 2023-06-19 DIAGNOSIS — F90.2 ATTENTION DEFICIT HYPERACTIVITY DISORDER (ADHD), COMBINED TYPE: ICD-10-CM

## 2023-06-19 DIAGNOSIS — R29.3 POOR POSTURE: ICD-10-CM

## 2023-06-19 DIAGNOSIS — L30.9 ECZEMA, UNSPECIFIED TYPE: ICD-10-CM

## 2023-06-19 PROCEDURE — 99214 OFFICE O/P EST MOD 30 MIN: CPT | Performed by: FAMILY MEDICINE

## 2023-06-19 RX ORDER — ATOMOXETINE 18 MG/1
18 CAPSULE ORAL DAILY
Qty: 30 CAPSULE | Refills: 0 | Status: SHIPPED | OUTPATIENT
Start: 2023-06-19

## 2023-06-19 NOTE — PROGRESS NOTES
Name: David Caceres      : 2000      MRN: [id number]  Encounter Provider: Ethan Amaya DO  Encounter Date: 2023   Encounter department: [facility] [place] [place] [address] [address] [place] [id number]     1  JACOB (generalized anxiety disorder)  Continue with Lexapro and therapy  2  Attention deficit hyperactivity disorder (ADHD), combined type  -     atoMOXetine (STRATTERA) 18 mg capsule; Take 1 capsule (18 mg total) by mouth daily    3  Poor posture  Relative to tight pectoral muscles  Discussed stretching and formal PT   -     Ambulatory Referral to Physical Therapy; Future    4  Eczema, unspecified type  Refill requested  -     triamcinolone (KENALOG) 0 1 % ointment; Apply topically 2 (two) times a day         Subjective      HPI     Patient presents to the office for evaluation  Notes that she has been seeing her therapist  States that she has been having issues with executive function  States that she feels overwhelmed by tasks, states that when she knows something is important she has a hard time starting it because she does not want to mess it up  States that she has issues with focus, she is easily side tracked from things  Reports that she often takes a break but then does not return to the task  Is on a waiting list to see psychiatry  Reports that she wants a medication for ADHD that is short acting and PRN, this was what her therapist told her to ask for  Seeing Akash Carmona (therapist) from [facility]  Reports that her anxiety has been controlled for her  States that there are good days and bad days  JACOB-7 Flowsheet Screening    Flowsheet Row Most Recent Value   Over the last 2 weeks, how often have you been bothered by any of the following problems?     Feeling nervous, anxious, or on edge 1   Not being able to stop or control worrying 0   Worrying too much about different things 2   Trouble relaxing 2   Being so restless that it is hard to sit still 1 "  Becoming easily annoyed or irritable 1   Feeling afraid as if something awful might happen 1   JACOB-7 Total Score 8        Notes that her shoulders fall forward when she is sitting at rest, states that she finds that this causes discomfort in chest (pec area)  Notes that she is wearing supportive bras, does not have issues with her breasts  States that she sometimes having some pain in her upper back  Review of Systems    Current Outpatient Medications on File Prior to Visit   Medication Sig   • albuterol (PROVENTIL HFA,VENTOLIN HFA) 90 mcg/act inhaler Inhale 2 puffs every 4 (four) hours as needed for wheezing or shortness of breath (cough)   • escitalopram (LEXAPRO) 20 mg tablet TAKE 1 TABLET BY MOUTH EVERY DAY   • Galcanezumab-gnlm 120 MG/ML SOAJ Inject 120 mg under the skin every 28 days   • hydrOXYzine HCL (ATARAX) 25 mg tablet Take 1 tablet (25 mg total) by mouth every 6 (six) hours as needed for anxiety   • Junel FE 1/20 1-20 MG-MCG per tablet Take 1 tablet by mouth daily   • ondansetron (ZOFRAN) 4 mg tablet Take 1-2 tablets by mouth as needed   • SUMAtriptan (IMITREX) 100 mg tablet Take 1 tablet (100 mg total) by mouth once as needed for migraine May repeat x1 in 2 hours, max 2/24 hours, 3/week, 9/month   • [DISCONTINUED] triamcinolone (KENALOG) 0 1 % ointment Apply topically 2 (two) times a day     Objective     /64   Pulse 87   Temp 98 1 °F (36 7 °C)   Ht 5' 2\" (1 575 m)   Wt 59 kg (130 lb)   SpO2 97%   BMI 23 78 kg/m²     Physical Exam  Vitals reviewed  Constitutional:       General: She is not in acute distress  Appearance: Normal appearance  HENT:      Head: Normocephalic and atraumatic  Right Ear: External ear normal       Left Ear: External ear normal       Nose: Nose normal       Mouth/Throat:      Mouth: Mucous membranes are moist    Eyes:      Extraocular Movements: Extraocular movements intact        Conjunctiva/sclera: Conjunctivae normal    Cardiovascular:      Rate " and Rhythm: Normal rate and regular rhythm  Heart sounds: Normal heart sounds  Pulmonary:      Effort: Pulmonary effort is normal       Breath sounds: Normal breath sounds  No wheezing, rhonchi or rales  Abdominal:      General: Bowel sounds are normal  There is no distension  Palpations: Abdomen is soft  Tenderness: There is no abdominal tenderness  Musculoskeletal:      Right lower leg: No edema  Left lower leg: No edema  Comments: B/L pectoral muscles tighness and tenderness on palpation  Shoulders rounded and rolled forward with rest     Skin:     General: Skin is warm  Capillary Refill: Capillary refill takes less than 2 seconds  Findings: No rash  Neurological:      Mental Status: She is alert  Mental status is at baseline           Cristino Vera, DO

## 2023-07-12 DIAGNOSIS — Z30.41 ENCOUNTER FOR SURVEILLANCE OF CONTRACEPTIVE PILLS: ICD-10-CM

## 2023-07-12 DIAGNOSIS — F90.2 ATTENTION DEFICIT HYPERACTIVITY DISORDER (ADHD), COMBINED TYPE: ICD-10-CM

## 2023-07-12 RX ORDER — ATOMOXETINE 18 MG/1
CAPSULE ORAL
Qty: 90 CAPSULE | Refills: 1 | Status: SHIPPED | OUTPATIENT
Start: 2023-07-12

## 2023-07-12 RX ORDER — NORETHINDRONE ACETATE AND ETHINYL ESTRADIOL AND FERROUS FUMARATE 1MG-20(21)
KIT ORAL
Qty: 84 TABLET | Refills: 0 | Status: SHIPPED | OUTPATIENT
Start: 2023-07-12 | End: 2023-09-15 | Stop reason: SDUPTHER

## 2023-08-11 DIAGNOSIS — F41.0 PANIC DISORDER: ICD-10-CM

## 2023-08-11 RX ORDER — HYDROXYZINE HYDROCHLORIDE 25 MG/1
25 TABLET, FILM COATED ORAL EVERY 6 HOURS PRN
Qty: 30 TABLET | Refills: 0 | Status: SHIPPED | OUTPATIENT
Start: 2023-08-11

## 2023-09-14 DIAGNOSIS — Z30.41 ENCOUNTER FOR SURVEILLANCE OF CONTRACEPTIVE PILLS: ICD-10-CM

## 2023-09-15 RX ORDER — NORETHINDRONE ACETATE AND ETHINYL ESTRADIOL AND FERROUS FUMARATE 1MG-20(21)
KIT ORAL
Qty: 84 TABLET | Refills: 0 | Status: SHIPPED | OUTPATIENT
Start: 2023-09-15

## 2023-09-28 ENCOUNTER — TELEPHONE (OUTPATIENT)
Dept: NEUROLOGY | Facility: CLINIC | Age: 23
End: 2023-09-28

## 2023-09-28 ENCOUNTER — TELEMEDICINE (OUTPATIENT)
Dept: NEUROLOGY | Facility: CLINIC | Age: 23
End: 2023-09-28
Payer: COMMERCIAL

## 2023-09-28 DIAGNOSIS — G43.009 MIGRAINE WITHOUT AURA AND WITHOUT STATUS MIGRAINOSUS, NOT INTRACTABLE: Primary | ICD-10-CM

## 2023-09-28 PROCEDURE — 99215 OFFICE O/P EST HI 40 MIN: CPT | Performed by: PSYCHIATRY & NEUROLOGY

## 2023-09-28 RX ORDER — RIZATRIPTAN BENZOATE 10 MG/1
10 TABLET ORAL ONCE AS NEEDED
Qty: 9 TABLET | Refills: 11 | Status: SHIPPED | OUTPATIENT
Start: 2023-09-28

## 2023-09-28 NOTE — PROGRESS NOTES
Virtual Regular Visit    Verification of patient location:  Other   Lives in [place]       Assessment/Plan:    Problem List Items Addressed This Visit        Cardiovascular and Mediastinum    Migraine without aura and without status migrainosus, not intractable - Primary    Relevant Medications    rizatriptan (MAXALT) 10 mg tablet            Reason for visit is   Chief Complaint   Patient presents with   • Migraine        Encounter provider Brigida Chatman MD    Provider located at [address] [place] [id number]      Recent Visits  No visits were found meeting these conditions. Showing recent visits within past 7 days and meeting all other requirements  Today's Visits  Date Type Provider Dept   09/28/23 Telephone Brigida Chatman MD [address] [place] [address] Rd   09/28/23 Telephone Brigida Chatman MD [address] [place] [address] Rd   09/28/23 Telemedicine Brigida Chatman MD  [facility]   Showing today's visits and meeting all other requirements  Future Appointments  No visits were found meeting these conditions. Showing future appointments within next 150 days and meeting all other requirements       The patient was identified by name and date of birth. Maria L Mares was informed that this is a telemedicine visit and that the visit is being conducted through the [employer]. She agrees to proceed. .  My office door was closed. No one else was in the room. She acknowledged consent and understanding of privacy and security of the video platform. The patient has agreed to participate and understands they can discontinue the visit at any time. Patient is aware this is a billable service.      Subjective    Assessment/Plan:   Maria L Mares is a delightful  26 y.o. female with a past medical history that includes migraine, anxiety, panic disorder, moderately severe major depression, Normocytic anemia, thyroid nodules, GERD, scoliosis, costochondritis (7-8 months post viral illness early 2020), sinus Tarsi syndrome of left ankle, vertigo, sleep onset insomnia referred here for evaluation of headache. My initial evaluation 12/19/2022     Migraine without aura and without status migrainosus, not intractable  She reports a long history of headaches and migraines dating back to early teens as well as a family history of migraines in mom. She reports pain is typically bilateral parietal and can be retro-orbital and occipital bilaterally. She denies aura and reports typical associated migrainous features without unilateral autonomic features. - As of 12/19/2022 she reports approximately 1 migraine a week, but can last up to 3 days. Will increase sumatriptan 50 mg which is helping some but not enough, to 100 mg and if still not helpful will add trial of rizatriptan next for rescue. She is not currently interested in prescription preventative, listed headache preventative supplements she could try if she would like. - as of 5/25/2023: She reports significant improvement on Emgality with approximately 2 migraines a month that resolved with ibuprofen. Tried and failed propranolol. Sumatriptan 100 mg works better than 50 mg but she is not even needing it much anymore since they are so much better. - as of 9/28/2023: She reports doing remarkably well with Emgality and 1 headache this month that typically responds to ibuprofen. We discussed trial of rizatriptan and replacement of sumatriptan to see if this works better for migraine rescue and then if does not, could consider Ubrelvy or Nurtec which the more taking can also help with migraine prevention. We discussed at any point would recommend considering sleep study if headaches increase or can obtain at any time to evaluate for relationship to ADHD. Workup:  - non contrast head CT 12/15/18: No acute intracranial abnormality.   Per radiology    Preventative:  - we discussed headache hygiene and lifestyle factors that may improve headaches  -  continue   Galcanezumab-gnlm 120 MG/ML SOAJ; Inject 120 mg under the skin every 28 days. Discussed proper use, possible side effects and risks. - Currently on through other providers: Lexapro/escitalopram 20 mg, trial of atomoxetine/strattera 18 mg for ADHD in June and Aug decreased appetite and waking up more  - Past/ failed/contraindicated:  Lexapro/escitalopram, topiramate side effects, amitriptyline and venlafaxine contraindicated interaction with current medications  - future options: Alternative CGRP med, botox    Rescue:  - recommend not taking over-the-counter or prescription pain medications more than 3 days per week to prevent medication overuse/rebound headache  -   Trial of  rizatriptan (MAXALT) 10 mg tablet; Take 1 tablet (10 mg total) by mouth once as needed for migraine May repeat in 2 hours if needed. Max 2/24 hours, 9/month. Discussed proper use, possible side effects and risks. - Currently on through other providers: Ibuprofen/Advil helps, ondansetron/Zofran for nausea, hydroxyzine as needed anxiety  - Past/ failed/contraindicated: stopping sumatriptan 50 mg since not helping, sumatriptan 100 mg  -Past/tolerated: Medrol Dosepak  - future options: Rizatriptan, prochlorperazine, Toradol IM or p.o., could consider trial of 5 days of Depakote 500 mg nightly or dexamethasone 2 mg daily for prolonged migraine, ubrelvy, reyvow, nurtec      Patient instructions     Let me know if you would ever want the sleep study     Headache/migraine treatment:   Rescue medications (for immediate treatment of a headache): It is ok to take ibuprofen, acetaminophen or naproxen (Advil, Tylenol,  Aleve, Excedrin) if they help your headaches you should limit these to No more than 3 times a week to avoid medication overuse/rebound headaches.      For your more moderate to severe migraines take this medication early   Maxalt (rizatriptan) 10mg tabs - take one at the onset of headache. May repeat one time after 2 hours if pain has not resolved. (Max 2 a day and 9 a month)     If this does not work or causes bothersome side effects let me know and I will send in either Nurtec or Siri Campuzano which will likely need a prior authorization paper that says you tried sumatriptan and rizatriptan and then once approved you should be able to get either Nurtec or Ubrelvy depending on what your insurance prefers. Then if needed there are coupons to completely cover the co-pay for these meds as they can be expensive otherwise. Over the counter preventive supplements for headaches/migraines (if you try, try for 3 months straight)  (to take every day to help prevent headaches - not to take at the time of headache): There are combo pills online of these - none of which regulated by FDA and double check dosing - take appropriate dose only once a day- *preventa migraine, migravent, mind ease, migrelief   [] Magnesium 400mg daily (If any diarrhea or upset stomach, decrease dose  as tolerated)  [] Riboflavin (Vitamin B2) 400mg daily - try online   (FYI B2 may make your urine bright/neon yellow)  AND/OR  [] Herbal medication: Petasites/Butterbur 150 mg daily - try online  (When choosing your Butterbur online or in the store, beware that there are some poor preps containing pyrrolizidine alkaloids (PAs) that can be harmful to the liver. Therefore, do not use butterbur products that are not labeled as PA-free.)    Prescription preventive medications for headaches/migraines   (to take every day to help prevent headaches - not to take at the time of headache):  [x]   Emgality/Galcanezumab -  120 mg injections every 28 days    If needed there is a coupon card for the copay at [employer]     READ INSTRUCTIONS that come with the medication. REFRIGERATE. Keep out of direct sunlight.  Prior to administration, allow to come to room temperature for 30 minutes. Do not warm using a heat source (eg, microwave or hot water). Do not shake. Administer in preferably abdomen (avoiding 2 inches around the navel), thigh, upper arm, or buttocks avoiding areas of skin that are tender, bruised, red or hard. Deliver entire contents of single-use prefilled pen or syringe. Unknown impact in pregnancy therefore would recommend stopping 6 months prior to considering pregnancy. *Typically these types of medications take time until you see the benefit, although some may see improvement in days, often it may take weeks, especially if the medication is being titrated up to a beneficial level. Please contact us if there are any concerns or questions regarding the medication. Lifestyle Recommendations:  [x] SLEEP - Maintain a regular sleep schedule: Adults need at least 7-8 hours of uninterrupted a night. Maintain good sleep hygiene:  Going to bed and waking up at consistent times, avoiding excessive daytime naps, avoiding caffeinated beverages in the evening, avoid excessive stimulation in the evening and generally using bed primarily for sleeping. One hour before bedtime would recommend turning lights down lower, decreasing your activity (may read quietly, listen to music at a low volume). When you get into bed, should eliminate all technology (no texting, emailing, playing with your phone, iPad or tablet in bed). [x] HYDRATION - Maintain good hydration. Drink  2L of fluid a day (4 typical small water bottles)  [x] DIET - Maintain good nutrition. In particular don't skip meals and try and eat healthy balanced meals regularly. [x] TRIGGERS - Look for other triggers and avoid them: Limit caffeine to 1-2 cups a day or less. Avoid dietary triggers that you have noticed bring on your headaches (this could include aged cheese, peanuts, MSG, aspartame and nitrates).   [x] EXERCISE - physical exercise as we all know is good for you in many ways, and not only is good for your heart, but also is beneficial for your mental health, cognitive health and  chronic pain/headaches. I would encourage at the least 5 days of physical exercise weekly for at least 30 minutes. Education and Follow-up  [x] Please call with any questions or concerns. Of course if any new concerning symptoms go to the emergency department. [x] Follow up 6 months, sooner if needed         CC: We had the pleasure of evaluating Oliver Sharp in neurological consultation today. Oliver Sharp is a   right handed female who presents today for evaluation of headaches. History obtained from patient as well as available medical record review.   History of Present Illness:   Interval history as of 9/28/2023  - no significant new or concerning neurologic symptoms since last visit   - sleep - 6-8 hours, problems falling, side sleeper, at night make wake 3 times a night due to neighbor upstairs, has snored  - eye exam - no contacts or glasses, no vision changes    Headaches and migraines   1 headache in the last month that responded to ibuprofen   Summer was ok - a few caffeine withdrawal headaches     Preventative:   - emgality monthly on time -not usually delayed, didn't get the 3 month supply, no SE   - Currently on through other providers: Lexapro/escitalopram 20 mg, trial of atomoxetine/strattera 18 mg for ADHD in June and Aug decreased appetite and waking up more    Abortive:   Ibuprofen 400 mg helps usually  Sumatriptan ok when takes it rarely -  no tired        Interval history as of 5/25/2023  - no significant new or concerning neurologic symptoms since last visit     Headaches and migraines   She reports significant improvement with approximately 2 migraines a month and ibuprofen resolves them    Preventative:   -Trial of propranolol-side effects  -trial of Emgality sent 1/18/2023- PA approved and she had 2 shots beginning of February  She ran out of [place] in February she was very stressed and it was out of stock and she had to play phone tag to find it, she attempted to give her dose 2 weeks late and she started also self with the injector and ripped it out before 10 seconds was up and did not get all the injected medicine and she got a replacement from a company and it malfunctioned again    Abortive:   Trial of sumatriptan 100 mg-she wrote in 1/7/2023 reporting it helps but migraines can last 3 days  She reports now that Emgality is on board sumatriptan works much better but she has not needed it since now ibuprofen/Advil helps  Denies bothersome side effects        Headaches started at what age? [age] years old  How often do the headaches occur?   - as of 12/19/2022: She reports on average 1 migraine a week that can last 2-3 days for the past 2-3 years, 12 days a month    typically improves the sumatriptan over a few hours   What time of the day do the headaches start? No particular time of day   How long do the headaches last? Over 4 hours without meds up to days  Are you ever headache free? Yes     Aura? without aura     Last eye exam: not in years     Where is your headache located and pain quality?   - usually bilateral, parietal - sharp, pulsating, pressure  Sometimes can go retro-orbital - bilateral, Left > right  Can also be mid occipital     What is the intensity of pain? Average: 6-7/10, worst 8.5/10  Associated symptoms:   [x] Nausea       [] Vomiting   - not typically       [x] Photophobia     [x]Phonophobia      [x] Osmophobia  [] Blurred vision    [x] Light-headed or dizzy  - sometimes, not often    [] Tinnitus   [] Hands or feet tingle or feel numb/paresthesias    [] Ptosis      [] Facial droop  [] Lacrimation  [] Nasal congestion/rhinorrhea      Things that make the headache worse? No specific movements    Headache triggers:  Unknown except for stress, more at college     Have you seen someone else for headaches or pain? Yes, PCP  Have you had trigger point injection performed and how often?  No  Have you had Botox injection performed and how often? No   Have you had epidural injections or transforaminal injections performed? No  Are you current pregnant or planning on getting pregnant? Not for years, on [facility]   Have you ever had any Brain imaging? yes     What medications do you take or have you taken for your headaches? ABORTIVE/pertinent p.r.n.  Meds:    OTC medications have been ineffective     Sumatriptan/Imitrex 50 mg - no SE   Ondansetron/Zofran for nausea    Past  Medrol Dosepak 2018 tolerated    PREVENTIVE/pertinent daily meds:     Lexapro/escitalopram 20 mg for about 2 years  Hydroxyzine as needed anxiety    Past/ failed/contraindicated:  Amitriptyline and venlafaxine would be contraindicated interaction with current medications  Headache preventative supplements 2018  topiramate - so nauseated could not ear for days and brain fog badly       LIFESTYLE  Sleep   - averages: 6-7 hours   Problems falling asleep?:   Yes, usually   Problems staying asleep?:  Not too much except for head lights     Physical activity:   Intramural soccer   snowboarding every weekend when home    Water: 2 large per day - 64 oz   Caffeine: 1-2 monster a week     Mood:  anxiety, depression - better on lexapro, in counseling now in person     The following portions of the patient's history were reviewed and updated as appropriate: allergies, current medications, past family history, past medical history, past social history, past surgical history and problem list.    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  Lives with parents at home and roommates at college      Past Medical History:   Diagnosis Date   • Hordeolum externum     unspecified laterality Last assessed: 5/1/2014   • Scoliosis     description: 6.3 degrees in 3/2013   • Walking pneumonia     last assessed: 1/20/2016       Past Surgical History:   Procedure Laterality Date   • ANKLE SURGERY Left 04/2017       Current Outpatient Medications   Medication Sig Dispense Refill   • albuterol (PROVENTIL HFA,VENTOLIN HFA) 90 mcg/act inhaler Inhale 2 puffs every 4 (four) hours as needed for wheezing or shortness of breath (cough) 1 Inhaler 0   • escitalopram (LEXAPRO) 20 mg tablet TAKE 1 TABLET BY MOUTH EVERY DAY 90 tablet 1   • Galcanezumab-gnlm 120 MG/ML SOAJ Inject 120 mg under the skin every 28 days 3 mL 4   • hydrOXYzine HCL (ATARAX) 25 mg tablet TAKE 1 TABLET BY MOUTH EVERY 6 HOURS AS NEEDED FOR ANXIETY. 30 tablet 0   • Junel FE 1/20 1-20 MG-MCG per tablet TAKE 1 TABLET BY MOUTH EVERY DAY 84 tablet 0   • ondansetron (ZOFRAN) 4 mg tablet Take 1-2 tablets by mouth as needed     • rizatriptan (MAXALT) 10 mg tablet Take 1 tablet (10 mg total) by mouth once as needed for migraine May repeat in 2 hours if needed. Max 2/24 hours, 9/month. 9 tablet 11   • triamcinolone (KENALOG) 0.1 % ointment Apply topically 2 (two) times a day 30 g 0   • atoMOXetine (STRATTERA) 18 mg capsule TAKE 1 CAPSULE BY MOUTH DAILY. (Patient not taking: Reported on 9/28/2023) 90 capsule 1     No current facility-administered medications for this visit. No Known Allergies      Objective:     Exam limited by Video  Physical Exam:                                                                 Vitals:            Constitutional:    There were no vitals taken for this visit. BP Readings from Last 3 Encounters:   06/19/23 112/64   12/19/22 127/83   07/22/22 110/72     Pulse Readings from Last 3 Encounters:   06/19/23 87   12/19/22 86   07/20/22 77         Well developed, well nourished, No dysmorphic features. HEENT:  Normocephalic atraumatic.  See neuro exam   Psychiatric:  Normal behavior and appropriate affect        Neurological Examination:     Mental status/cognitive function:   Recent and remote memory appear intact. Attention span and concentration as well as fund of knowledge appear appropriate for age. Normal language and spontaneous speech. Cranial Nerves:   VII-facial expression symmetric  Motor Exam: symmetric bulk throughout. no atrophy, fasciculations or abnormal movements noted. Coordination:  no apparent dysmetria, ataxia or tremor noted    Pertinent lab results:   - 11/11/22: CBC unremarkable except for hemoglobin 11.7 MCV 88  CMP unremarkable except for chloride 109, glucose 114, calcium 8.1  - 2/13/2019 TSH normal     Imaging: I have personally reviewed imaging and radiology read   - non contrast head CT 12/15/18: No acute intracranial abnormality. Per radiology. Per my retrospective review 9/20/2023 I Erla Saint Agatha prominent optic nerve sheath bilaterally with some tortuosity, cerebellar tonsils overlie the foramen magnum with tight entry point right greater than left, difficult to appreciate if empty sella without more views. At any point if new or concerning symptoms can absolutely obtain MRI brain       Review of Systems:   ROS obtained by medical assistant and personally reviewed, but if any symptoms listed below say negative, does not mean patient has not had this symptom since last visit, please see HPI for details of symptoms discussed this visit. I recommended PCP follow up for non neurologic problems. Review of Systems   Constitutional: Negative for appetite change and fever. HENT: Negative. Negative for hearing loss, tinnitus, trouble swallowing and voice change. Eyes: Negative. Negative for photophobia and pain. Respiratory: Negative. Negative for shortness of breath. Cardiovascular: Negative. Negative for palpitations. Gastrointestinal: Negative. Negative for nausea and vomiting. Endocrine: Negative. Negative for cold intolerance. Genitourinary: Negative. Negative for dysuria, frequency and urgency. Musculoskeletal: Negative. Negative for myalgias and neck pain. Skin: Negative. Negative for rash. Neurological: Positive for headaches. Negative for dizziness, tremors, seizures, syncope, facial asymmetry, speech difficulty, weakness, light-headedness and numbness. Hematological: Negative. Does not bruise/bleed easily. Psychiatric/Behavioral: Negative. Negative for confusion, hallucinations and sleep disturbance. All other systems reviewed and are negative. I have spent 26 minutes with Patient today in which greater than 50% of this time was spent in counseling/coordination of care regarding Diagnostic results, Prognosis, Risks and benefits of tx options, Instructions for management, Patient  education, Importance of tx compliance, Risk factor reductions, Impressions, Counseling / Coordination of care, Documenting in the medical record, Reviewing / ordering tests, medicine, procedures   and Obtaining or reviewing history  . I also spent 15 minutes non face to face for this patient the same day.            Visit Time  Total Visit Duration: 41

## 2023-09-28 NOTE — TELEPHONE ENCOUNTER
I called patient to request that she return my call in order to schedule her for her next 6 mo.  appointment with Dr. Anitha Allen
no fever and no chills.

## 2023-09-28 NOTE — PATIENT INSTRUCTIONS
Let me know if you would ever want the sleep study     Headache/migraine treatment:   Rescue medications (for immediate treatment of a headache): It is ok to take ibuprofen, acetaminophen or naproxen (Advil, Tylenol,  Aleve, Excedrin) if they help your headaches you should limit these to No more than 3 times a week to avoid medication overuse/rebound headaches. For your more moderate to severe migraines take this medication early   Maxalt (rizatriptan) 10mg tabs - take one at the onset of headache. May repeat one time after 2 hours if pain has not resolved. (Max 2 a day and 9 a month)     If this does not work or causes bothersome side effects let me know and I will send in either Nurtec or [employer] which will likely need a prior authorization paper that says you tried sumatriptan and rizatriptan and then once approved you should be able to get either Nurtec or Ubrelvy depending on what your insurance prefers. Then if needed there are coupons to completely cover the co-pay for these meds as they can be expensive otherwise. Over the counter preventive supplements for headaches/migraines (if you try, try for 3 months straight)  (to take every day to help prevent headaches - not to take at the time of headache): There are combo pills online of these - none of which regulated by FDA and double check dosing - take appropriate dose only once a day- *preventa migraine, migravent, mind ease, migrelief   [] Magnesium 400mg daily (If any diarrhea or upset stomach, decrease dose  as tolerated)  [] Riboflavin (Vitamin B2) 400mg daily - try online   (FYI B2 may make your urine bright/neon yellow)  AND/OR  [] Herbal medication: Petasites/Butterbur 150 mg daily - try online  (When choosing your Butterbur online or in the store, beware that there are some poor preps containing pyrrolizidine alkaloids (PAs) that can be harmful to the liver.  Therefore, do not use butterbur products that are not labeled as PA-free.)    Prescription preventive medications for headaches/migraines   (to take every day to help prevent headaches - not to take at the time of headache):  [x]   Emgality/Galcanezumab -  120 mg injections every 28 days    If needed there is a coupon card for the copay at [employer]. [employer]     READ INSTRUCTIONS that come with the medication. REFRIGERATE. Keep out of direct sunlight. Prior to administration, allow to come to room temperature for 30 minutes. Do not warm using a heat source (eg, microwave or hot water). Do not shake. Administer in preferably abdomen (avoiding 2 inches around the navel), thigh, upper arm, or buttocks avoiding areas of skin that are tender, bruised, red or hard. Deliver entire contents of single-use prefilled pen or syringe. Unknown impact in pregnancy therefore would recommend stopping 6 months prior to considering pregnancy. *Typically these types of medications take time until you see the benefit, although some may see improvement in days, often it may take weeks, especially if the medication is being titrated up to a beneficial level. Please contact us if there are any concerns or questions regarding the medication. Lifestyle Recommendations:  [x] SLEEP - Maintain a regular sleep schedule: Adults need at least 7-8 hours of uninterrupted a night. Maintain good sleep hygiene:  Going to bed and waking up at consistent times, avoiding excessive daytime naps, avoiding caffeinated beverages in the evening, avoid excessive stimulation in the evening and generally using bed primarily for sleeping. One hour before bedtime would recommend turning lights down lower, decreasing your activity (may read quietly, listen to music at a low volume). When you get into bed, should eliminate all technology (no texting, emailing, playing with your phone, iPad or tablet in bed). [x] HYDRATION - Maintain good hydration.   Drink  2L of fluid a day (4 typical small water bottles)  [x] DIET - Maintain good nutrition. In particular don't skip meals and try and eat healthy balanced meals regularly. [x] TRIGGERS - Look for other triggers and avoid them: Limit caffeine to 1-2 cups a day or less. Avoid dietary triggers that you have noticed bring on your headaches (this could include aged cheese, peanuts, MSG, aspartame and nitrates). [x] EXERCISE - physical exercise as we all know is good for you in many ways, and not only is good for your heart, but also is beneficial for your mental health, cognitive health and  chronic pain/headaches. I would encourage at the least 5 days of physical exercise weekly for at least 30 minutes. Education and Follow-up  [x] Please call with any questions or concerns. Of course if any new concerning symptoms go to the emergency department.   [x] Follow up 6 months, sooner if needed

## 2023-09-29 ENCOUNTER — TELEPHONE (OUTPATIENT)
Dept: NEUROLOGY | Facility: CLINIC | Age: 23
End: 2023-09-29

## 2023-10-24 ENCOUNTER — PATIENT MESSAGE (OUTPATIENT)
Dept: NEUROLOGY | Facility: CLINIC | Age: 23
End: 2023-10-24

## 2023-11-02 ENCOUNTER — TELEPHONE (OUTPATIENT)
Dept: NEUROLOGY | Facility: CLINIC | Age: 23
End: 2023-11-02

## 2023-11-02 NOTE — TELEPHONE ENCOUNTER
Emgality PA previous approved for 1 pen per 30 days   Approved through 6/13/24    Rinku Guzman [phone], spoke with Nuzhat Colbert, she then transferred me to [facility]  [phone] and spoke to Mary. She ran a coverage check for 3 pens per 84 days.  States that it does through with a copay $354.84, copay for 1 pen is   $121.25    Would not need a new PA for 3 pens per 84 days    [employer] message sent to pt

## 2023-11-02 NOTE — TELEPHONE ENCOUNTER
----- Message from Oliver Sharp sent at 10/24/2023 10:07 AM EDT -----  Regarding: Emgality 3 month supply  Contact: [phone]  Good morning,    In the [facility] dannielle, and in person I was told that the emgality prescription allows me to  3 months at a time. Unfortunately in my [facility] profile they have it on a one month refill. Is this something I have to resolve with the pharmacy or does it require a doctor interaction?

## 2023-11-30 DIAGNOSIS — F41.0 PANIC DISORDER: ICD-10-CM

## 2023-11-30 RX ORDER — HYDROXYZINE HYDROCHLORIDE 25 MG/1
25 TABLET, FILM COATED ORAL EVERY 6 HOURS PRN
Qty: 30 TABLET | Refills: 0 | Status: SHIPPED | OUTPATIENT
Start: 2023-11-30

## 2023-12-24 DIAGNOSIS — F32.2 MODERATELY SEVERE MAJOR DEPRESSION (HCC): ICD-10-CM

## 2023-12-26 RX ORDER — ESCITALOPRAM OXALATE 20 MG/1
TABLET ORAL
Qty: 90 TABLET | Refills: 1 | Status: SHIPPED | OUTPATIENT
Start: 2023-12-26

## 2024-02-12 DIAGNOSIS — G43.009 MIGRAINE WITHOUT AURA AND WITHOUT STATUS MIGRAINOSUS, NOT INTRACTABLE: ICD-10-CM

## 2024-02-13 NOTE — TELEPHONE ENCOUNTER
Received fax from [facility]. Emgality req PA  Russell [id number]    Urgent PA initiated on CMM.   Your information has been submitted and will be reviewed by [facility]. You may close this dialog, return to your dashboard, and perform other tasks. An electronic determination will be received in CoverMyMeds within  hours. You can see the latest determination by locating this request on your dashboard or by reopening this request. You will receive a fax copy of the determination. If [employer] has not responded in 120 hours, contact [facility] at [phone].     Awaiting determination

## 2024-02-19 NOTE — TELEPHONE ENCOUNTER
Per [facility]-CaseId:[id number];Status:Approved;Review Type:Prior Auth;Coverage Start Date:02/13/2024;Coverage End Date:08/13/2024;.     Authorization Expiration Date: August 13, 2024.     Called [facility] pharmacy and left a detailed message on on their answering machine letting them know of the approval.

## 2024-02-21 PROBLEM — N39.0 RECURRENT UTI: Status: RESOLVED | Noted: 2017-10-19 | Resolved: 2024-02-21

## 2024-02-21 PROBLEM — R05.9 COUGH: Status: RESOLVED | Noted: 2020-04-28 | Resolved: 2024-02-21

## 2024-04-24 DIAGNOSIS — F41.0 PANIC DISORDER: ICD-10-CM

## 2024-04-26 RX ORDER — HYDROXYZINE HYDROCHLORIDE 25 MG/1
25 TABLET, FILM COATED ORAL EVERY 6 HOURS PRN
Qty: 30 TABLET | Refills: 0 | Status: SHIPPED | OUTPATIENT
Start: 2024-04-26

## 2024-07-01 DIAGNOSIS — G43.009 MIGRAINE WITHOUT AURA AND WITHOUT STATUS MIGRAINOSUS, NOT INTRACTABLE: ICD-10-CM

## 2024-07-02 RX ORDER — GALCANEZUMAB 120 MG/ML
INJECTION, SOLUTION SUBCUTANEOUS
Qty: 1 ML | Refills: 11 | Status: SHIPPED | OUTPATIENT
Start: 2024-07-02

## 2024-07-14 DIAGNOSIS — F32.2 MODERATELY SEVERE MAJOR DEPRESSION (HCC): ICD-10-CM

## 2024-07-15 RX ORDER — ESCITALOPRAM OXALATE 20 MG/1
TABLET ORAL
Qty: 90 TABLET | Refills: 1 | Status: SHIPPED | OUTPATIENT
Start: 2024-07-15

## 2024-08-05 DIAGNOSIS — F41.0 PANIC DISORDER: ICD-10-CM

## 2024-08-06 RX ORDER — HYDROXYZINE HYDROCHLORIDE 25 MG/1
25 TABLET, FILM COATED ORAL EVERY 6 HOURS PRN
Qty: 30 TABLET | Refills: 0 | Status: SHIPPED | OUTPATIENT
Start: 2024-08-06

## 2024-08-09 ENCOUNTER — TELEPHONE (OUTPATIENT)
Dept: NEUROLOGY | Facility: CLINIC | Age: 24
End: 2024-08-09

## 2025-01-20 DIAGNOSIS — F41.0 PANIC DISORDER: ICD-10-CM

## 2025-01-21 RX ORDER — HYDROXYZINE HYDROCHLORIDE 25 MG/1
25 TABLET, FILM COATED ORAL EVERY 6 HOURS PRN
Qty: 30 TABLET | Refills: 0 | Status: SHIPPED | OUTPATIENT
Start: 2025-01-21

## 2025-03-14 ENCOUNTER — TELEPHONE (OUTPATIENT)
Dept: NEUROLOGY | Facility: CLINIC | Age: 25
End: 2025-03-14

## 2025-03-22 ENCOUNTER — TELEPHONE (OUTPATIENT)
Dept: NEUROLOGY | Facility: CLINIC | Age: 25
End: 2025-03-22

## 2025-03-22 DIAGNOSIS — G43.009 MIGRAINE WITHOUT AURA AND WITHOUT STATUS MIGRAINOSUS, NOT INTRACTABLE: ICD-10-CM

## 2025-03-22 RX ORDER — GALCANEZUMAB 120 MG/ML
INJECTION, SOLUTION SUBCUTANEOUS
Qty: 1 ML | Refills: 0 | Status: CANCELLED | OUTPATIENT
Start: 2025-03-22

## 2025-03-24 NOTE — TELEPHONE ENCOUNTER
Reason for call:   [x] Prior Auth  [] Other:     Caller:  [] Patient  [x] Pharmacy  Name:   Address:   Callback Number:       Ordering Provider:   [] PCP/Provider -   [x] Speciality/Provider - [facility]     Has the patient tried other medications and failed? If failed, which medications did they fail?    [] No   [] Yes -     Is the patient's insurance updated in EPIC?   [] Yes   [] No     Is a copy of the patient's insurance scanned in EPIC?   [] Yes   [] No

## 2025-03-26 NOTE — TELEPHONE ENCOUNTER
Digna Potts to Kavita Fair MD  Neurology Pod Clerical   TA    3/24/25  9:53 AM  Refill must be reviewed and completed by the office or provider. The refill is unable to be approved or denied by the medication management team.      Pharmacy located in [place]      Please review to see if the refill is appropriate.     Last OV: 9/28/2023  Per last OV:  Upcoming visit: None

## 2025-03-26 NOTE — TELEPHONE ENCOUNTER
Kavita Fair MD to Neurology Concussion & Migraine Team 5       3/24/25 10:18 AM  From my quick review it looks like prior Auth is good through September and refills are good through July?  Unless she changed insurances?  As there are encounters for new prior Auth this month?

## 2025-03-26 NOTE — TELEPHONE ENCOUNTER
Emgality PA addressed in 3/14 encounter.   Emgality approved    [employer] message sent to pt

## 2025-06-12 ENCOUNTER — HOSPITAL ENCOUNTER (EMERGENCY)
Facility: HOSPITAL | Age: 25
Discharge: HOME | End: 2025-06-12
Attending: EMERGENCY MEDICINE | Admitting: EMERGENCY MEDICINE
Payer: COMMERCIAL

## 2025-06-12 ENCOUNTER — APPOINTMENT (EMERGENCY)
Dept: CT IMAGING | Facility: HOSPITAL | Age: 25
End: 2025-06-12
Payer: COMMERCIAL

## 2025-06-12 ENCOUNTER — APPOINTMENT (EMERGENCY)
Dept: ULTRASOUND IMAGING | Facility: HOSPITAL | Age: 25
End: 2025-06-12
Payer: COMMERCIAL

## 2025-06-12 VITALS
DIASTOLIC BLOOD PRESSURE: 55 MMHG | OXYGEN SATURATION: 97 % | HEART RATE: 80 BPM | SYSTOLIC BLOOD PRESSURE: 116 MMHG | RESPIRATION RATE: 15 BRPM | TEMPERATURE: 98.6 F

## 2025-06-12 DIAGNOSIS — R10.13 EPIGASTRIC PAIN: ICD-10-CM

## 2025-06-12 DIAGNOSIS — R11.10 HYPEREMESIS: Primary | ICD-10-CM

## 2025-06-12 LAB
ALBUMIN SERPL BCG-MCNC: 4.7 G/DL (ref 3.5–5)
ALP SERPL-CCNC: 47 U/L (ref 34–104)
ALT SERPL W P-5'-P-CCNC: 24 U/L (ref 7–52)
ANION GAP SERPL CALCULATED.3IONS-SCNC: 17 MMOL/L (ref 4–13)
AST SERPL W P-5'-P-CCNC: 35 U/L (ref 13–39)
BASOPHILS # BLD MANUAL: 0.17 THOUSAND/UL (ref 0–0.1)
BASOPHILS NFR MAR MANUAL: 1 % (ref 0–1)
BILIRUB SERPL-MCNC: 2.76 MG/DL (ref 0.2–1)
BUN SERPL-MCNC: 14 MG/DL (ref 5–25)
CALCIUM SERPL-MCNC: 9.8 MG/DL (ref 8.4–10.2)
CHLORIDE SERPL-SCNC: 102 MMOL/L (ref 96–108)
CO2 SERPL-SCNC: 19 MMOL/L (ref 21–32)
CREAT SERPL-MCNC: 0.76 MG/DL (ref 0.6–1.3)
EOSINOPHIL # BLD MANUAL: 0 THOUSAND/UL (ref 0–0.4)
EOSINOPHIL NFR BLD MANUAL: 0 % (ref 0–6)
ERYTHROCYTE [DISTWIDTH] IN BLOOD BY AUTOMATED COUNT: 11.9 % (ref 11.6–15.1)
GFR SERPL CREATININE-BSD FRML MDRD: 110 ML/MIN/1.73SQ M
GLUCOSE SERPL-MCNC: 136 MG/DL (ref 65–140)
HCG SERPL QL: NEGATIVE
HCT VFR BLD AUTO: 37.8 % (ref 34.8–46.1)
HGB BLD-MCNC: 13.8 G/DL (ref 11.5–15.4)
HYPERCHROMIA BLD QL SMEAR: PRESENT
LACTATE SERPL-SCNC: 2 MMOL/L (ref 0.5–2)
LIPASE SERPL-CCNC: 7 U/L (ref 11–82)
LYMPHOCYTES # BLD AUTO: 1.73 THOUSAND/UL (ref 0.6–4.47)
LYMPHOCYTES # BLD AUTO: 9 % (ref 14–44)
MCH RBC QN AUTO: 30.3 PG (ref 26.8–34.3)
MCHC RBC AUTO-ENTMCNC: 36.5 G/DL (ref 31.4–37.4)
MCV RBC AUTO: 83 FL (ref 82–98)
MONOCYTES # BLD AUTO: 0.87 THOUSAND/UL (ref 0–1.22)
MONOCYTES NFR BLD: 5 % (ref 4–12)
NEUTROPHILS # BLD MANUAL: 14.53 THOUSAND/UL (ref 1.85–7.62)
NEUTS SEG NFR BLD AUTO: 84 % (ref 43–75)
PLATELET # BLD AUTO: 330 THOUSANDS/UL (ref 149–390)
PLATELET BLD QL SMEAR: ADEQUATE
PMV BLD AUTO: 9.5 FL (ref 8.9–12.7)
POTASSIUM SERPL-SCNC: 3 MMOL/L (ref 3.5–5.3)
PROT SERPL-MCNC: 7.9 G/DL (ref 6.4–8.4)
RBC # BLD AUTO: 4.55 MILLION/UL (ref 3.81–5.12)
RBC MORPH BLD: PRESENT
SODIUM SERPL-SCNC: 138 MMOL/L (ref 135–147)
VARIANT LYMPHS # BLD AUTO: 1 %
WBC # BLD AUTO: 17.3 THOUSAND/UL (ref 4.31–10.16)

## 2025-06-12 PROCEDURE — 80053 COMPREHEN METABOLIC PANEL: CPT

## 2025-06-12 PROCEDURE — 85007 BL SMEAR W/DIFF WBC COUNT: CPT

## 2025-06-12 PROCEDURE — 83690 ASSAY OF LIPASE: CPT

## 2025-06-12 PROCEDURE — 96376 TX/PRO/DX INJ SAME DRUG ADON: CPT

## 2025-06-12 PROCEDURE — 96365 THER/PROPH/DIAG IV INF INIT: CPT

## 2025-06-12 PROCEDURE — 84703 CHORIONIC GONADOTROPIN ASSAY: CPT

## 2025-06-12 PROCEDURE — 96366 THER/PROPH/DIAG IV INF ADDON: CPT

## 2025-06-12 PROCEDURE — 99285 EMERGENCY DEPT VISIT HI MDM: CPT | Performed by: EMERGENCY MEDICINE

## 2025-06-12 PROCEDURE — 96375 TX/PRO/DX INJ NEW DRUG ADDON: CPT

## 2025-06-12 PROCEDURE — 74177 CT ABD & PELVIS W/CONTRAST: CPT

## 2025-06-12 PROCEDURE — 83605 ASSAY OF LACTIC ACID: CPT

## 2025-06-12 PROCEDURE — 76705 ECHO EXAM OF ABDOMEN: CPT

## 2025-06-12 PROCEDURE — 36415 COLL VENOUS BLD VENIPUNCTURE: CPT

## 2025-06-12 PROCEDURE — 93005 ELECTROCARDIOGRAM TRACING: CPT

## 2025-06-12 PROCEDURE — 99284 EMERGENCY DEPT VISIT MOD MDM: CPT

## 2025-06-12 PROCEDURE — 96361 HYDRATE IV INFUSION ADD-ON: CPT

## 2025-06-12 PROCEDURE — 85027 COMPLETE CBC AUTOMATED: CPT

## 2025-06-12 RX ORDER — ONDANSETRON 4 MG/1
4 TABLET, ORALLY DISINTEGRATING ORAL EVERY 6 HOURS PRN
Qty: 20 TABLET | Refills: 0 | Status: SHIPPED | OUTPATIENT
Start: 2025-06-12

## 2025-06-12 RX ORDER — DROPERIDOL 2.5 MG/ML
0.62 INJECTION, SOLUTION INTRAMUSCULAR; INTRAVENOUS ONCE
Status: COMPLETED | OUTPATIENT
Start: 2025-06-12 | End: 2025-06-12

## 2025-06-12 RX ORDER — HYDROMORPHONE HCL/PF 1 MG/ML
0.5 SYRINGE (ML) INJECTION ONCE
Refills: 0 | Status: COMPLETED | OUTPATIENT
Start: 2025-06-12 | End: 2025-06-12

## 2025-06-12 RX ORDER — MAGNESIUM HYDROXIDE/ALUMINUM HYDROXICE/SIMETHICONE 120; 1200; 1200 MG/30ML; MG/30ML; MG/30ML
30 SUSPENSION ORAL ONCE
Status: COMPLETED | OUTPATIENT
Start: 2025-06-12 | End: 2025-06-12

## 2025-06-12 RX ORDER — SUCRALFATE 1 G/1
1 TABLET ORAL 4 TIMES DAILY
Qty: 56 TABLET | Refills: 0 | Status: SHIPPED | OUTPATIENT
Start: 2025-06-12 | End: 2025-06-26

## 2025-06-12 RX ORDER — POTASSIUM CHLORIDE 14.9 MG/ML
20 INJECTION INTRAVENOUS ONCE
Status: COMPLETED | OUTPATIENT
Start: 2025-06-12 | End: 2025-06-12

## 2025-06-12 RX ORDER — LIDOCAINE HYDROCHLORIDE 20 MG/ML
15 SOLUTION OROPHARYNGEAL ONCE
Status: COMPLETED | OUTPATIENT
Start: 2025-06-12 | End: 2025-06-12

## 2025-06-12 RX ORDER — CAPSAICIN 0.07 G/100G
CREAM TOPICAL 3 TIMES DAILY PRN
Qty: 57 G | Refills: 0 | Status: SHIPPED | OUTPATIENT
Start: 2025-06-12

## 2025-06-12 RX ORDER — PANTOPRAZOLE SODIUM 40 MG/1
40 TABLET, DELAYED RELEASE ORAL DAILY
Qty: 30 TABLET | Refills: 0 | Status: SHIPPED | OUTPATIENT
Start: 2025-06-12

## 2025-06-12 RX ORDER — FAMOTIDINE 10 MG/ML
20 INJECTION, SOLUTION INTRAVENOUS ONCE
Status: COMPLETED | OUTPATIENT
Start: 2025-06-12 | End: 2025-06-12

## 2025-06-12 RX ADMIN — DROPERIDOL 0.62 MG: 2.5 INJECTION, SOLUTION INTRAMUSCULAR; INTRAVENOUS at 15:21

## 2025-06-12 RX ADMIN — POTASSIUM CHLORIDE 20 MEQ: 14.9 INJECTION, SOLUTION INTRAVENOUS at 17:02

## 2025-06-12 RX ADMIN — HYDROMORPHONE HYDROCHLORIDE 0.5 MG: 1 INJECTION, SOLUTION INTRAMUSCULAR; INTRAVENOUS; SUBCUTANEOUS at 18:14

## 2025-06-12 RX ADMIN — IOHEXOL 100 ML: 350 INJECTION, SOLUTION INTRAVENOUS at 16:14

## 2025-06-12 RX ADMIN — LIDOCAINE HYDROCHLORIDE 15 ML: 20 SOLUTION ORAL at 18:10

## 2025-06-12 RX ADMIN — ALUMINUM HYDROXIDE, MAGNESIUM HYDROXIDE, AND DIMETHICONE 30 ML: 200; 20; 200 SUSPENSION ORAL at 18:09

## 2025-06-12 RX ADMIN — DROPERIDOL 0.62 MG: 2.5 INJECTION, SOLUTION INTRAMUSCULAR; INTRAVENOUS at 17:01

## 2025-06-12 RX ADMIN — SODIUM CHLORIDE 1000 ML: 0.9 INJECTION, SOLUTION INTRAVENOUS at 15:25

## 2025-06-12 RX ADMIN — FAMOTIDINE 20 MG: 10 INJECTION, SOLUTION INTRAVENOUS at 16:53

## 2025-06-12 NOTE — ED PROVIDER NOTES
Time reflects when diagnosis was documented in both MDM as applicable and the Disposition within this note       Time User Action Codes Description Comment    6/12/2025  7:19 PM Lesly Gleason Add [R11.10] Hyperemesis     6/12/2025  7:33 PM Lesly Gleason Add [R10.13] Epigastric pain           ED Disposition       ED Disposition   Discharge    Condition   Stable    Date/Time   Thu Jun 12, 2025  7:33 PM    Comment   Jenni Elder discharge to home/self care.                   Assessment & Plan       Medical Decision Making  25-year-old female patient presents the ER for evaluation of abdominal pain.  Patient stated that 2 days ago she was drinking and started vomiting yesterday morning.  Patient stated that this has happened previously after a night of drinking.  She reports that she is had nonbloody bilious vomit throughout the last 24 hours.  She reports that she has severe epigastric pain.  The pain is constant and does not radiate.  Patient does smoke marijuana daily.  She has not been taking her antianxiety/antidepressants due to vomiting.        Abdominal exam is without peritoneal signs.  No evidence of acute abdomen at this time.  Well-appearing.  Given workup, low suspicion for acute biliary disease, acute pancreatitis, acute infectious process to include but not limited to pneumonia, pyelonephritis, appendicitis.   CBC shows noted leukocytosis, although likely reactive from vomiting.   Potassium was 3.0.  Patient was not able to tolerate p.o. intake initially and was given IV potassium.  There was noted elevated T. bili and concern for severe epigastric pain patient had an ultrasound which was unremarkable.  Patient given droperidol, GI cocktail, fluids and Pepcid.  lipase negative for pancreatitis.  Patient was given capsaicin, Protonix and Carafate.  advised to follow-up with gastroenterology.  Return to the ER symptoms worsens or questions or concerns arise at home.      Amount and/or Complexity of Data  Reviewed  Labs: ordered. Decision-making details documented in ED Course.  Radiology: ordered. Decision-making details documented in ED Course.    Risk  OTC drugs.  Prescription drug management.        ED Course as of 06/12/25 1944   Thu Jun 12, 2025   1520 CBC and differential(!)  WBC 17.3   1631 CT abdomen pelvis with contrast  No acute abdominopelvic pathology.   1700 Primary RN, reports that the patient's nausea and vomiting returned. More droperidol ordered.   1801 US right upper quadrant  Normal exam.       Medications   sodium chloride 0.9 % bolus 1,000 mL (0 mL Intravenous Stopped 6/12/25 1625)   droperidol (INAPSINE) injection 0.625 mg (0.625 mg Intravenous Given 6/12/25 1521)   Famotidine (PF) (PEPCID) injection 20 mg (20 mg Intravenous Given 6/12/25 1653)   aluminum-magnesium hydroxide-simethicone (MAALOX) oral suspension 30 mL (30 mL Oral Given 6/12/25 1809)   Lidocaine Viscous HCl (XYLOCAINE) 2 % mucosal solution 15 mL (15 mL Swish & Spit Given 6/12/25 1810)   iohexol (OMNIPAQUE) 350 MG/ML injection (MULTI-DOSE) 100 mL (100 mL Intravenous Given 6/12/25 1614)   potassium chloride 20 mEq IVPB (premix) (0 mEq Intravenous Stopped 6/12/25 1902)   droperidol (INAPSINE) injection 0.625 mg (0.625 mg Intravenous Given 6/12/25 1701)   HYDROmorphone (DILAUDID) injection 0.5 mg (0.5 mg Intravenous Given 6/12/25 1814)       ED Risk Strat Scores                    No data recorded        SBIRT 20yo+      Flowsheet Row Most Recent Value   Initial Alcohol Screen: US AUDIT-C     1. How often do you have a drink containing alcohol? 0 Filed at: 06/12/2025 1532   2. How many drinks containing alcohol do you have on a typical day you are drinking?  0 Filed at: 06/12/2025 1532   3b. FEMALE Any Age, or MALE 65+: How often do you have 4 or more drinks on one occassion? 0 Filed at: 06/12/2025 1532   Audit-C Score 0 Filed at: 06/12/2025 1532   CB: How many times in the past year have you...    Used an illegal drug or used a  prescription medication for non-medical reasons? Never Filed at: 06/12/2025 0368                            History of Present Illness       Chief Complaint   Patient presents with    Vomiting     States she began vomiting yesterday after drinking etoh the night before. C/o abd pain        Past Medical History[1]   Past Surgical History[2]   Family History[3]   Social History[4]   E-Cigarette/Vaping    E-Cigarette Use Never User       E-Cigarette/Vaping Substances    Nicotine No     THC No     CBD No     Flavoring No     Other No     Unknown No       I have reviewed and agree with the history as documented.     26 y/o female patient presents to the ER for evaluation of vomiting.             Review of Systems   Constitutional:  Negative for chills and fever.   HENT:  Negative for ear pain and sore throat.    Eyes:  Negative for pain and visual disturbance.   Respiratory:  Negative for cough and shortness of breath.    Cardiovascular:  Negative for chest pain and palpitations.   Gastrointestinal:  Positive for abdominal pain, nausea and vomiting.   Genitourinary:  Negative for dysuria and hematuria.   Musculoskeletal:  Negative for arthralgias and back pain.   Skin:  Negative for color change and rash.   Neurological:  Negative for seizures and syncope.   Psychiatric/Behavioral:  The patient is hyperactive.    All other systems reviewed and are negative.          Objective       ED Triage Vitals   Temperature Pulse Blood Pressure Respirations SpO2 Patient Position - Orthostatic VS   06/12/25 1435 06/12/25 1435 06/12/25 1435 06/12/25 1435 06/12/25 1435 06/12/25 1435   98.6 °F (37 °C) 76 125/87 22 98 % Sitting      Temp Source Heart Rate Source BP Location FiO2 (%) Pain Score    06/12/25 1435 06/12/25 1435 06/12/25 1435 -- 06/12/25 1814    Temporal Monitor Left arm  6      Vitals      Date and Time Temp Pulse SpO2 Resp BP Pain Score FACES Pain Rating User   06/12/25 1900 -- 80 97 % 15 116/55 -- -- [facility]   06/12/25 1814 --  -- -- -- -- 6 -- [facility]   06/12/25 1800 -- 78 98 % 19 123/91 -- -- [facility]   06/12/25 1745 -- 94 98 % 21 134/89 -- -- [facility]   06/12/25 1435 98.6 °F (37 °C) 76 98 % 22 125/87 -- -- GP            Physical Exam  Vitals and nursing note reviewed.   Constitutional:       General: She is not in acute distress.     Appearance: She is well-developed. She is ill-appearing.   HENT:      Head: Normocephalic and atraumatic.      Right Ear: External ear normal.      Left Ear: External ear normal.     Eyes:      Conjunctiva/sclera: Conjunctivae normal.       Cardiovascular:      Rate and Rhythm: Normal rate and regular rhythm.      Pulses: Normal pulses.      Heart sounds: Normal heart sounds.   Pulmonary:      Effort: Pulmonary effort is normal. No respiratory distress.      Breath sounds: Normal breath sounds.   Abdominal:      Palpations: Abdomen is soft.      Tenderness: There is abdominal tenderness in the epigastric area.     Musculoskeletal:         General: No swelling.      Cervical back: Neck supple.     Skin:     General: Skin is warm and dry.      Capillary Refill: Capillary refill takes less than 2 seconds.     Neurological:      Mental Status: She is alert and oriented to person, place, and time.     Psychiatric:         Mood and Affect: Mood normal.         Behavior: Behavior is hyperactive.         Results Reviewed       Procedure Component Value Units Date/Time    Manual Differential(PHLEBS Do Not Order) [[id number]]  (Abnormal) Collected: 06/12/25 1511    Lab Status: Final result Specimen: Blood from Arm, Right Updated: 06/12/25 1619     Segmented % 84 %      Lymphocytes % 9 %      Monocytes % 5 %      Eosinophils % 0 %      Basophils % 1 %      Atypical Lymphocytes % 1 %      Absolute Neutrophils 14.53 Thousand/uL      Absolute Lymphocytes 1.73 Thousand/uL      Absolute Monocytes 0.87 Thousand/uL      Absolute Eosinophils 0.00 Thousand/uL      Absolute Basophils 0.17 Thousand/uL      Total Counted --     RBC Morphology  Present     Platelet Estimate Adequate     Hypochromia Present    Lipase [[id number]]  (Abnormal) Collected: 06/12/25 1511    Lab Status: Final result Specimen: Blood from Arm, Right Updated: 06/12/25 1609     Lipase 7 u/L     Comprehensive metabolic panel [[id number]]  (Abnormal) Collected: 06/12/25 1511    Lab Status: Final result Specimen: Blood from Arm, Right Updated: 06/12/25 1609     Sodium 138 mmol/L      Potassium 3.0 mmol/L      Chloride 102 mmol/L      CO2 19 mmol/L      ANION GAP 17 mmol/L      BUN 14 mg/dL      Creatinine 0.76 mg/dL      Glucose 136 mg/dL      Calcium 9.8 mg/dL      AST 35 U/L      ALT 24 U/L      Alkaline Phosphatase 47 U/L      Total Protein 7.9 g/dL      Albumin 4.7 g/dL      Total Bilirubin 2.76 mg/dL      eGFR 110 ml/min/1.73sq m     Narrative:      National Kidney Disease Foundation guidelines for Chronic Kidney Disease (CKD):     Stage 1 with normal or high GFR (GFR > 90 mL/min/1.73 square meters)    Stage 2 Mild CKD (GFR = 60-89 mL/min/1.73 square meters)    Stage 3A Moderate CKD (GFR = 45-59 mL/min/1.73 square meters)    Stage 3B Moderate CKD (GFR = 30-44 mL/min/1.73 square meters)    Stage 4 Severe CKD (GFR = 15-29 mL/min/1.73 square meters)    Stage 5 End Stage CKD (GFR <15 mL/min/1.73 square meters)  Note: GFR calculation is accurate only with a steady state creatinine    hCG, qualitative pregnancy [[id number]]  (Normal) Collected: 06/12/25 1511    Lab Status: Final result Specimen: Blood from Arm, Right Updated: 06/12/25 1539     Preg, Serum Negative    CBC and differential [[id number]]  (Abnormal) Collected: 06/12/25 1511    Lab Status: Final result Specimen: Blood from Arm, Right Updated: 06/12/25 1531     WBC 17.30 Thousand/uL      RBC 4.55 Million/uL      Hemoglobin 13.8 g/dL      Hematocrit 37.8 %      MCV 83 fL      MCH 30.3 pg      MCHC 36.5 g/dL      RDW 11.9 %      MPV 9.5 fL      Platelets 330 Thousands/uL     Lactic acid, plasma (w/reflex if result > 2.0) [[id number]]   (Normal) Collected: 06/12/25 1511    Lab Status: Final result Specimen: Blood from Arm, Right Updated: 06/12/25 1530     LACTIC ACID 2.0 mmol/L     Narrative:      Result may be elevated if tourniquet was used during collection.            US right upper quadrant   Final Interpretation by Claus Dunn MD (06/12 0484)      Normal exam.      Workstation performed: [id number]         CT abdomen pelvis with contrast   Final Interpretation by Claus Dunn MD (06/12 3146)      No acute abdominopelvic pathology.         Workstation performed: [id number]             Procedures    ED Medication and Procedure Management   Prior to Admission Medications   Prescriptions Last Dose Informant Patient Reported? Taking?   Galcanezumab-gnlm (Emgality) 120 MG/ML SOAJ   No No   Sig: INJECT 120 MG UNDER THE SKIN EVERY 28 DAYS   Junel FE 1/20 1-20 MG-MCG per tablet   No No   Sig: TAKE 1 TABLET BY MOUTH EVERY DAY   albuterol (PROVENTIL HFA,VENTOLIN HFA) 90 mcg/act inhaler  Self No No   Sig: Inhale 2 puffs every 4 (four) hours as needed for wheezing or shortness of breath (cough)   atoMOXetine (STRATTERA) 18 mg capsule   No No   Sig: TAKE 1 CAPSULE BY MOUTH DAILY.   Patient not taking: Reported on 9/28/2023   escitalopram (LEXAPRO) 20 mg tablet   No No   Sig: TAKE 1 TABLET BY MOUTH EVERY DAY   hydrOXYzine HCL (ATARAX) 25 mg tablet   No No   Sig: TAKE 1 TABLET BY MOUTH EVERY 6 HOURS AS NEEDED FOR ANXIETY   ondansetron (ZOFRAN) 4 mg tablet   Yes No   Sig: Take 1-2 tablets by mouth as needed   rizatriptan (MAXALT) 10 mg tablet   No No   Sig: Take 1 tablet (10 mg total) by mouth once as needed for migraine May repeat in 2 hours if needed. Max 2/24 hours, 9/month.   triamcinolone (KENALOG) 0.1 % ointment   No No   Sig: Apply topically 2 (two) times a day      Facility-Administered Medications: None     Discharge Medication List as of 6/12/2025  7:36 PM        START taking these medications    Details   capsicum (ZOSTRIX) 0.075 % topical  cream Apply topically 3 (three) times a day as needed (vomiting/abdominal pain), Starting Thu 6/12/2025, Normal      ondansetron (ZOFRAN-ODT) 4 mg disintegrating tablet Take 1 tablet (4 mg total) by mouth every 6 (six) hours as needed for nausea or vomiting, Starting Thu 6/12/2025, Normal      pantoprazole (PROTONIX) 40 mg tablet Take 1 tablet (40 mg total) by mouth daily, Starting Thu 6/12/2025, Normal      sucralfate (CARAFATE) 1 g tablet Take 1 tablet (1 g total) by mouth 4 (four) times a day for 14 days, Starting Thu 6/12/2025, Until Thu 6/26/2025, Normal           CONTINUE these medications which have NOT CHANGED    Details   albuterol (PROVENTIL HFA,VENTOLIN HFA) 90 mcg/act inhaler Inhale 2 puffs every 4 (four) hours as needed for wheezing or shortness of breath (cough), Starting Fri 5/1/2020, Normal      atoMOXetine (STRATTERA) 18 mg capsule TAKE 1 CAPSULE BY MOUTH DAILY., Normal      escitalopram (LEXAPRO) 20 mg tablet TAKE 1 TABLET BY MOUTH EVERY DAY, Normal      Galcanezumab-gnlm (Emgality) 120 MG/ML SOAJ INJECT 120 MG UNDER THE SKIN EVERY 28 DAYS, Normal      hydrOXYzine HCL (ATARAX) 25 mg tablet TAKE 1 TABLET BY MOUTH EVERY 6 HOURS AS NEEDED FOR ANXIETY, Starting Tue 1/21/2025, Normal      Junel FE 1/20 1-20 MG-MCG per tablet TAKE 1 TABLET BY MOUTH EVERY DAY, Normal      ondansetron (ZOFRAN) 4 mg tablet Take 1-2 tablets by mouth as needed, Starting Fri 11/11/2022, Historical Med      rizatriptan (MAXALT) 10 mg tablet Take 1 tablet (10 mg total) by mouth once as needed for migraine May repeat in 2 hours if needed. Max 2/24 hours, 9/month., Starting Thu 9/28/2023, Normal      triamcinolone (KENALOG) 0.1 % ointment Apply topically 2 (two) times a day, Starting Mon 6/19/2023, Normal             ED SEPSIS DOCUMENTATION   Time reflects when diagnosis was documented in both MDM as applicable and the Disposition within this note       Time User Action Codes Description Comment    6/12/2025  7:19 PM Lesly Gleason  Add [R11.10] Hyperemesis     6/12/2025  7:33 PM Lesly Gleason Add [R10.13] Epigastric pain                      [1]   Past Medical History:  Diagnosis Date    Hordeolum externum     unspecified laterality Last assessed: 5/1/2014    Scoliosis     description: 6.3 degrees in 3/2013    Walking pneumonia     last assessed: 1/20/2016   [2]   Past Surgical History:  Procedure Laterality Date    ANKLE SURGERY Left 04/2017   [3]   Family History  Problem Relation Name Age of Onset    Migraines Mother Cheli     No Known Problems Father Brain     Bone cancer Family Maternal Relatives     Heart disease Family Paternal Relatives     Lung cancer Maternal Grandmother      Multiple sclerosis Maternal Grandmother      No Known Problems Paternal Grandmother      Heart disease Paternal Grandfather     [4]   Social History  Tobacco Use    Smoking status: Never    Smokeless tobacco: Never    Tobacco comments:     no smoke exposure   Vaping Use    Vaping status: Never Used   Substance Use Topics    Alcohol use: Yes     Alcohol/week: 2.0 standard drinks of alcohol     Types: 2 Standard drinks or equivalent per week    Drug use: Never        KENNEDY Dietz  06/12/25 1945

## 2025-06-12 NOTE — DISCHARGE INSTRUCTIONS
Capsaicin three times a day as need for abdominal pain/vomiting  Protonix- take with no other medication with a large glass of water  Zofran- every 6 hours as needed for nausea/vomiting  Carafate- four times a day   Follow up with GI  Return to ER if symptoms worsen

## 2025-06-13 LAB
ATRIAL RATE: 83 BPM
P AXIS: 67 DEGREES
PR INTERVAL: 146 MS
QRS AXIS: 59 DEGREES
QRSD INTERVAL: 84 MS
QT INTERVAL: 390 MS
QTC INTERVAL: 458 MS
T WAVE AXIS: 46 DEGREES
VENTRICULAR RATE: 83 BPM

## 2025-06-13 PROCEDURE — 93010 ELECTROCARDIOGRAM REPORT: CPT | Performed by: INTERNAL MEDICINE

## 2025-08-19 ENCOUNTER — TELEPHONE (OUTPATIENT)
Age: 25
End: 2025-08-19